# Patient Record
Sex: FEMALE | Race: WHITE | NOT HISPANIC OR LATINO | Employment: UNEMPLOYED | URBAN - METROPOLITAN AREA
[De-identification: names, ages, dates, MRNs, and addresses within clinical notes are randomized per-mention and may not be internally consistent; named-entity substitution may affect disease eponyms.]

---

## 2017-03-09 ENCOUNTER — OFFICE VISIT (OUTPATIENT)
Dept: INTERNAL MEDICINE | Facility: CLINIC | Age: 57
End: 2017-03-09

## 2017-03-09 VITALS
HEIGHT: 62 IN | BODY MASS INDEX: 30.91 KG/M2 | WEIGHT: 168 LBS | DIASTOLIC BLOOD PRESSURE: 80 MMHG | SYSTOLIC BLOOD PRESSURE: 160 MMHG | HEART RATE: 78 BPM

## 2017-03-09 DIAGNOSIS — I10 BENIGN ESSENTIAL HTN: ICD-10-CM

## 2017-03-09 DIAGNOSIS — R06.09 DYSPNEA ON EXERTION: ICD-10-CM

## 2017-03-09 DIAGNOSIS — J45.20 MILD INTERMITTENT ASTHMA WITHOUT COMPLICATION: Primary | ICD-10-CM

## 2017-03-09 PROCEDURE — 99213 OFFICE O/P EST LOW 20 MIN: CPT | Performed by: INTERNAL MEDICINE

## 2017-03-09 RX ORDER — HYDROCHLOROTHIAZIDE 12.5 MG/1
12.5 TABLET ORAL DAILY
Qty: 90 TABLET | Refills: 3 | Status: SHIPPED | OUTPATIENT
Start: 2017-03-09 | End: 2017-05-10

## 2017-03-09 NOTE — PROGRESS NOTES
Subjective   Nguyen Lopez is a 56 y.o. female.     History of Present Illness she is here today for follow-up of hypertension.  On losartan 50 mg daily she has had systolic pressure in the 120s to 130s and diastolic in the 60s to 70s.  She does relate problems with dyspnea on exertion when climbing one flight of stairs as well as some swelling in the ankles over the last one or 2 months.  She denied any PND, wheezing, or unusual cough.  She has not had any chest pain.  She had difficulty with swelling in the ankles previously with a negative evaluation.        Review of Systems   Constitutional: Negative for activity change, appetite change and fatigue.   Respiratory: Positive for cough and shortness of breath. Negative for chest tightness and wheezing.    Cardiovascular: Positive for leg swelling. Negative for chest pain and palpitations.   Gastrointestinal: Negative for abdominal pain, diarrhea, nausea and vomiting.   Genitourinary: Negative for dysuria, flank pain and hematuria.   Musculoskeletal: Positive for back pain and neck pain.   Neurological: Negative for dizziness and weakness.       Objective   Physical Exam   Constitutional: She is oriented to person, place, and time. She appears well-developed and well-nourished. She is active. She does not appear ill.   Eyes: Conjunctivae are normal.   Neck: Carotid bruit is not present.   Cardiovascular: Normal rate, regular rhythm, S1 normal and S2 normal.  Exam reveals no S3 and no S4.    No murmur heard.  Pulses:       Dorsalis pedis pulses are 2+ on the right side, and 2+ on the left side.   Pulmonary/Chest: No tachypnea. No respiratory distress. She has no decreased breath sounds. She has no wheezes. She has no rhonchi. She has no rales.   Abdominal: Soft. Normal appearance and bowel sounds are normal. She exhibits no abdominal bruit and no mass. There is no hepatosplenomegaly. There is no tenderness.       Vascular Status -  Her exam exhibits right foot  edema (Trace malleolar edema). Her exam exhibits left foot edema (Trace malleolar edema).  Neurological: She is alert and oriented to person, place, and time. Gait normal.   Psychiatric: She has a normal mood and affect. Her speech is normal and behavior is normal. Judgment and thought content normal. Cognition and memory are normal.       Assessment/Plan  November lab showed a normal CBC, CMP, urinalysis.  Echocardiogram done in November 2015 was normal.        Assessment #1 hypertension-good out of office readings on losartan #2 dyspnea on exertion and mild edema-question significance.  I wished to repeat 2-D echocardiogram but for financial reasons she does not wish to do so at this time.    Plan #1 add hydrochlorothiazide 12.5 mg by mouth daily to losartan 50 mg by mouth daily.  Routine follow-up with me in 2 months.

## 2017-04-28 DIAGNOSIS — F41.9 ANXIETY DISORDER, UNSPECIFIED TYPE: Primary | ICD-10-CM

## 2017-04-28 DIAGNOSIS — G43.019 COMMON MIGRAINE WITH INTRACTABLE MIGRAINE: ICD-10-CM

## 2017-05-02 RX ORDER — ALPRAZOLAM 0.5 MG/1
TABLET ORAL
Qty: 30 TABLET | Refills: 5 | Status: SHIPPED | OUTPATIENT
Start: 2017-05-02 | End: 2017-10-30 | Stop reason: SDUPTHER

## 2017-05-02 RX ORDER — BUTALBITAL, ACETAMINOPHEN AND CAFFEINE 50; 325; 40 MG/1; MG/1; MG/1
TABLET ORAL
Qty: 60 TABLET | Refills: 5 | Status: SHIPPED | OUTPATIENT
Start: 2017-05-03 | End: 2017-10-30 | Stop reason: SDUPTHER

## 2017-05-10 ENCOUNTER — OFFICE VISIT (OUTPATIENT)
Dept: INTERNAL MEDICINE | Facility: CLINIC | Age: 57
End: 2017-05-10

## 2017-05-10 VITALS
HEART RATE: 72 BPM | DIASTOLIC BLOOD PRESSURE: 80 MMHG | HEIGHT: 62 IN | SYSTOLIC BLOOD PRESSURE: 158 MMHG | WEIGHT: 167 LBS | BODY MASS INDEX: 30.73 KG/M2

## 2017-05-10 DIAGNOSIS — I87.2 CHRONIC VENOUS INSUFFICIENCY: ICD-10-CM

## 2017-05-10 DIAGNOSIS — I10 BENIGN ESSENTIAL HTN: ICD-10-CM

## 2017-05-10 DIAGNOSIS — M47.812 OSTEOARTHRITIS OF CERVICAL SPINE, UNSPECIFIED SPINAL OSTEOARTHRITIS COMPLICATION STATUS: Primary | ICD-10-CM

## 2017-05-10 PROBLEM — G43.009 MIGRAINE WITHOUT AURA, NOT INTRACTABLE: Status: ACTIVE | Noted: 2017-05-10

## 2017-05-10 PROBLEM — R06.09 DYSPNEA ON EXERTION: Status: RESOLVED | Noted: 2017-03-09 | Resolved: 2017-05-10

## 2017-05-10 PROCEDURE — 99213 OFFICE O/P EST LOW 20 MIN: CPT | Performed by: INTERNAL MEDICINE

## 2017-06-16 ENCOUNTER — TELEPHONE (OUTPATIENT)
Dept: INTERNAL MEDICINE | Facility: CLINIC | Age: 57
End: 2017-06-16

## 2017-06-16 RX ORDER — OXYCODONE HYDROCHLORIDE AND ACETAMINOPHEN 5; 325 MG/1; MG/1
1 TABLET ORAL 2 TIMES DAILY PRN
Qty: 40 TABLET | Refills: 0 | Status: SHIPPED | OUTPATIENT
Start: 2017-06-16 | End: 2017-08-02 | Stop reason: SDUPTHER

## 2017-06-16 NOTE — TELEPHONE ENCOUNTER
Ms Lopez would like to go back on diet pill, Phentermine. she was on this in the past.    Please advise

## 2017-06-16 NOTE — TELEPHONE ENCOUNTER
----- Message from Florencia Yan sent at 6/15/2017 11:11 AM EDT -----  Contact: Patient  Patient called requesting refill on     oxyCODONE-acetaminophen (PERCOCET) 5-325 MG per tablet    And also would like to go back on diet pill, Phentermine.  States she was on this in the past.    Would like to  both at the same time    Patient:  [phone]    Pharmacy:  [facility] [phone] [place] [phone]

## 2017-06-23 ENCOUNTER — TELEPHONE (OUTPATIENT)
Dept: INTERNAL MEDICINE | Facility: CLINIC | Age: 57
End: 2017-06-23

## 2017-06-23 NOTE — TELEPHONE ENCOUNTER
----- Message from Florencia Yan sent at 6/23/2017  9:15 AM EDT -----  Contact: Patient  Patient called.  She had picked up her Rx for Percocet this week.  When she took it to the pharmacy she was told our office had to call and give PA for this, and that this was a new law for narcotics with quantity more than 14.  States we need to tell pharmacy she has a chronic condition with diseased spine.  Please advise.     Patient:  [phone]     Pharmacy:  [facility] [address] [place] [address] AT [address] - [phone]  - [phone] FX

## 2017-06-23 NOTE — TELEPHONE ENCOUNTER
Entered PA into [employer].  Was told it would take 24 to 72 hours to get a response, from today 06/23/2017.

## 2017-06-26 ENCOUNTER — TELEPHONE (OUTPATIENT)
Dept: INTERNAL MEDICINE | Facility: CLINIC | Age: 57
End: 2017-06-26

## 2017-08-02 RX ORDER — OXYCODONE HYDROCHLORIDE AND ACETAMINOPHEN 5; 325 MG/1; MG/1
1 TABLET ORAL 2 TIMES DAILY PRN
Qty: 30 TABLET | Refills: 0 | Status: SHIPPED | OUTPATIENT
Start: 2017-08-02 | End: 2017-10-19 | Stop reason: SDUPTHER

## 2017-08-02 NOTE — TELEPHONE ENCOUNTER
----- Message from Gordon Gibson sent at 8/1/2017 10:34 AM EDT -----  Contact: pt  Pt calling would like refill on rx. Please call when ready for pickup.    oxyCODONE-acetaminophen (PERCOCET) 5-325 MG per tablet    Pt#[phone]    Pt says that she is coming in from [facility] tomorrow and would like to know if she can get that tomorrow .

## 2017-09-13 ENCOUNTER — OFFICE VISIT (OUTPATIENT)
Dept: INTERNAL MEDICINE | Facility: CLINIC | Age: 57
End: 2017-09-13

## 2017-09-13 VITALS
SYSTOLIC BLOOD PRESSURE: 132 MMHG | WEIGHT: 167 LBS | HEIGHT: 62 IN | BODY MASS INDEX: 30.73 KG/M2 | HEART RATE: 66 BPM | DIASTOLIC BLOOD PRESSURE: 80 MMHG

## 2017-09-13 DIAGNOSIS — M72.2 PLANTAR FASCIITIS: ICD-10-CM

## 2017-09-13 DIAGNOSIS — G43.009 MIGRAINE WITHOUT AURA AND WITHOUT STATUS MIGRAINOSUS, NOT INTRACTABLE: ICD-10-CM

## 2017-09-13 DIAGNOSIS — I10 BENIGN ESSENTIAL HTN: ICD-10-CM

## 2017-09-13 DIAGNOSIS — M47.812 OSTEOARTHRITIS OF CERVICAL SPINE, UNSPECIFIED SPINAL OSTEOARTHRITIS COMPLICATION STATUS: ICD-10-CM

## 2017-09-13 DIAGNOSIS — M51.37 DDD (DEGENERATIVE DISC DISEASE), LUMBOSACRAL: ICD-10-CM

## 2017-09-13 DIAGNOSIS — J45.20 MILD INTERMITTENT ASTHMA WITHOUT COMPLICATION: ICD-10-CM

## 2017-09-13 DIAGNOSIS — K51.30: ICD-10-CM

## 2017-09-13 DIAGNOSIS — F41.1 GENERALIZED ANXIETY DISORDER: Primary | ICD-10-CM

## 2017-09-13 PROCEDURE — 99214 OFFICE O/P EST MOD 30 MIN: CPT | Performed by: INTERNAL MEDICINE

## 2017-09-13 NOTE — PROGRESS NOTES
Subjective   Nguyen Lopez is a 56 y.o. female.     History of Present Illness she is here today for her yearly visit which includes follow-up of severe cervical spine and lumbar spine degenerative joint and degenerative disc disease with chronic pain, hypertension, chronic proctocolitis secondary to ulcerative colitis, chronic migraine, and asthma.  Her home blood pressure readings have been 130s over 80s generally.  She has frequent headaches for which uses Fiorinal in the morning.  Part of this relates to her chronic cervical spine pain as well as her chronic lumbar spine pain.  She uses 6 ibuprofen per day as well as 2 oxycodone per day for some pain relief.  She has a great deal of difficulty in walking as well as sleeping.  Over the last 10 months she has developed pain in the right foot involving the heel as well as the medial border of the foot.  She denies any dizziness, syncope, or focal neurologic symptoms.  She has not had any abdominal pain, diarrhea, rectal bleeding, or melanotic stool.  She denies any PND, dyspnea, chest pain, wheezing, or cough.        Review of Systems   Constitutional: Negative for activity change, appetite change and fatigue.   HENT: Negative for trouble swallowing.    Respiratory: Negative for cough, chest tightness, shortness of breath and wheezing.    Cardiovascular: Negative for chest pain, palpitations and leg swelling.   Gastrointestinal: Negative for abdominal pain, anal bleeding, blood in stool, constipation, diarrhea, nausea and vomiting.   Genitourinary: Negative for difficulty urinating, dysuria, flank pain, frequency and hematuria.   Musculoskeletal: Positive for back pain and neck pain. Negative for arthralgias and gait problem.   Neurological: Positive for numbness and headaches. Negative for dizziness, syncope, facial asymmetry, speech difficulty and weakness.   Psychiatric/Behavioral: Negative for confusion and dysphoric mood. The patient is not  nervous/anxious.        Objective   Physical Exam   Constitutional: She is oriented to person, place, and time. Vital signs are normal. She appears well-developed and well-nourished. She is active. She does not appear ill.   Eyes: Conjunctivae are normal.   Fundoscopic exam:       The right eye shows no AV nicking, no exudate and no hemorrhage.        The left eye shows no AV nicking, no exudate and no hemorrhage.   Neck: Carotid bruit is not present. No thyroid mass and no thyromegaly present.   Cardiovascular: Normal rate, regular rhythm, S1 normal and S2 normal.  Exam reveals no S3 and no S4.    No murmur heard.  Pulses:       Posterior tibial pulses are 2+ on the right side, and 2+ on the left side.   Pulmonary/Chest: No tachypnea. No respiratory distress. She has no decreased breath sounds. She has no wheezes. She has no rhonchi. She has no rales.   Abdominal: Soft. Normal appearance and bowel sounds are normal. She exhibits no abdominal bruit and no mass. There is no hepatosplenomegaly. There is no tenderness.       Vascular Status -  Her exam exhibits no right foot edema. Her exam exhibits no left foot edema.  Neurological: She is alert and oriented to person, place, and time. She has normal strength. Gait normal.   Reflex Scores:       Bicep reflexes are 2+ on the right side.  Psychiatric: She has a normal mood and affect. Her speech is normal and behavior is normal. Judgment and thought content normal. Cognition and memory are normal.       Assessment/Plan assessment #1 hypertension-good control without medication #2 chronic ulcerative proctitis-continued remission on medication #3 chronic migraine-unfortunately frequent but stable #4 right plantar fasciitis #5 cervical spine and lumbar spine degenerative joint and degenerative disc disease-chronic and problematic #6 reactive airway disease-quiescent    Plan #1 increase oxycodone to 5 mg twice a day when necessary #2 orthopedic consult.  Routine follow-up  with me in 6 months.

## 2017-10-19 RX ORDER — OXYCODONE HYDROCHLORIDE AND ACETAMINOPHEN 5; 325 MG/1; MG/1
1 TABLET ORAL 2 TIMES DAILY PRN
Qty: 20 TABLET | Refills: 0 | Status: SHIPPED | OUTPATIENT
Start: 2017-10-19 | End: 2017-11-20 | Stop reason: SDUPTHER

## 2017-10-19 NOTE — TELEPHONE ENCOUNTER
----- Message from Florencia Yan sent at 10/18/2017  3:23 PM EDT -----  Contact: Patient   Patient called requesting refill of     oxyCODONE-acetaminophen (PERCOCET) 5-325 MG per tablet    Please call when ready to be picked up.      Patient:  [phone]

## 2017-10-19 NOTE — TELEPHONE ENCOUNTER
Please review refill request:    Last Refill: 8/2/17    Last OV: 9/13/17    DAVIDSON: Ready    Thank you,    Von

## 2017-10-30 DIAGNOSIS — G43.019 COMMON MIGRAINE WITH INTRACTABLE MIGRAINE: ICD-10-CM

## 2017-10-30 DIAGNOSIS — F41.9 ANXIETY DISORDER, UNSPECIFIED TYPE: ICD-10-CM

## 2017-10-31 RX ORDER — ALPRAZOLAM 0.5 MG/1
TABLET ORAL
Qty: 30 TABLET | Refills: 5 | Status: SHIPPED | OUTPATIENT
Start: 2017-10-31 | End: 2018-04-23 | Stop reason: SDUPTHER

## 2017-10-31 RX ORDER — BUTALBITAL, ACETAMINOPHEN AND CAFFEINE 50; 325; 40 MG/1; MG/1; MG/1
TABLET ORAL
Qty: 60 TABLET | Refills: 5 | Status: SHIPPED | OUTPATIENT
Start: 2017-10-31 | End: 2018-04-23 | Stop reason: SDUPTHER

## 2017-10-31 NOTE — TELEPHONE ENCOUNTER
Last OV: 09/13/2017    Next OV: 03/15/2018    Last Fill: 09/29/2017  For both Alprazolam and Fioricet

## 2017-11-07 RX ORDER — OXYCODONE HYDROCHLORIDE AND ACETAMINOPHEN 5; 325 MG/1; MG/1
1 TABLET ORAL 2 TIMES DAILY PRN
Qty: 20 TABLET | Refills: 0 | OUTPATIENT
Start: 2017-11-07

## 2017-11-07 NOTE — TELEPHONE ENCOUNTER
Last OV: 09/13/2017    Next OV: 03/15/2018    Last Fill: 10/23/2017  10 day supply    Ms. Lopez stated she needs the 60 Oxycodone as she is always in pain since it was cut back to 20.  Without it, she stated it is hard to get here because of her spine pain.

## 2017-11-07 NOTE — TELEPHONE ENCOUNTER
----- Message from Margaux Medina MA sent at 11/6/2017  9:26 AM EST -----  Pt requests returned call to discuss medication changes and the quantity dispensed of  Percocet.  Pt says that [place] and traveling to office for scripts with her spine pain is difficult  Pt also says her new pharmacy did not receive recent refills    Pt#[phone]    [facility]  [place] #[phone]

## 2017-11-20 RX ORDER — OXYCODONE HYDROCHLORIDE AND ACETAMINOPHEN 5; 325 MG/1; MG/1
1 TABLET ORAL 2 TIMES DAILY PRN
Qty: 20 TABLET | Refills: 0 | Status: SHIPPED | OUTPATIENT
Start: 2017-11-22 | End: 2017-12-20 | Stop reason: SDUPTHER

## 2017-11-20 NOTE — TELEPHONE ENCOUNTER
----- Message from Maribel Gomez sent at 11/20/2017  8:30 AM EST -----  Contact: pt - Dr Lucas's pt - RE: script  Pt calling and would like a refill on Rx. Pt would like to inform that pt is completely out. Please advise. Thanks      oxyCODONE-acetaminophen (PERCOCET) 5-325 MG per tablet 20 tablet 0 10/19/2017      Sig - Route: Take 1 tablet by mouth 2 (Two) Times a Day As Needed (for pain). - Oral      Pt # [phone]

## 2017-12-20 RX ORDER — OXYCODONE HYDROCHLORIDE AND ACETAMINOPHEN 5; 325 MG/1; MG/1
1 TABLET ORAL 2 TIMES DAILY PRN
Qty: 20 TABLET | Refills: 0 | Status: SHIPPED | OUTPATIENT
Start: 2017-12-22 | End: 2018-02-21 | Stop reason: SDUPTHER

## 2017-12-20 NOTE — TELEPHONE ENCOUNTER
----- Message from Maribel Gomez sent at 12/18/2017  8:58 AM EST -----  Contact: pt - Dr Lucas's pt - RE: script  Pt calling and would like a refill on Rx      oxyCODONE-acetaminophen (PERCOCET) 5-325 MG per tablet 20 tablet      Sig - Route: Take 1 tablet by mouth 2 (Two) Times a Day As Needed (for pain). - Oral      Pt # [phone]

## 2018-01-18 RX ORDER — OXYCODONE HYDROCHLORIDE AND ACETAMINOPHEN 5; 325 MG/1; MG/1
1 TABLET ORAL 2 TIMES DAILY PRN
Qty: 20 TABLET | Refills: 0 | OUTPATIENT
Start: 2018-01-18

## 2018-01-18 NOTE — TELEPHONE ENCOUNTER
----- Message from Margaux West sent at 1/17/2018 11:06 AM EST -----  Contact: Patient  Patient called in requesting refill.  Please call when ready for .    oxyCODONE-acetaminophen (PERCOCET) 5-325 MG per tablet    Pt# [phone]

## 2018-01-23 ENCOUNTER — TELEPHONE (OUTPATIENT)
Dept: INTERNAL MEDICINE | Facility: CLINIC | Age: 58
End: 2018-01-23

## 2018-01-23 ENCOUNTER — OFFICE VISIT (OUTPATIENT)
Dept: INTERNAL MEDICINE | Facility: CLINIC | Age: 58
End: 2018-01-23

## 2018-01-23 VITALS
SYSTOLIC BLOOD PRESSURE: 142 MMHG | DIASTOLIC BLOOD PRESSURE: 90 MMHG | WEIGHT: 164 LBS | HEIGHT: 62 IN | BODY MASS INDEX: 30.18 KG/M2

## 2018-01-23 DIAGNOSIS — J45.20 MILD INTERMITTENT ASTHMA WITHOUT COMPLICATION: Primary | ICD-10-CM

## 2018-01-23 DIAGNOSIS — G43.009 MIGRAINE WITHOUT AURA AND WITHOUT STATUS MIGRAINOSUS, NOT INTRACTABLE: ICD-10-CM

## 2018-01-23 DIAGNOSIS — M47.22 CERVICAL RADICULOPATHY DUE TO DEGENERATIVE JOINT DISEASE OF SPINE: ICD-10-CM

## 2018-01-23 DIAGNOSIS — I10 BENIGN ESSENTIAL HTN: ICD-10-CM

## 2018-01-23 PROBLEM — M72.2 PLANTAR FASCIITIS: Status: RESOLVED | Noted: 2017-09-13 | Resolved: 2018-01-23

## 2018-01-23 LAB
ALBUMIN SERPL-MCNC: 4.3 G/DL (ref 3.5–5.2)
ALBUMIN/GLOB SERPL: 1.5 G/DL
ALP SERPL-CCNC: 62 U/L (ref 39–117)
ALT SERPL W P-5'-P-CCNC: 16 U/L (ref 1–33)
ANION GAP SERPL CALCULATED.3IONS-SCNC: 12.4 MMOL/L
AST SERPL-CCNC: 16 U/L (ref 1–32)
BASOPHILS # BLD AUTO: 0.04 10*3/MM3 (ref 0–0.2)
BASOPHILS NFR BLD AUTO: 0.5 % (ref 0–2)
BILIRUB SERPL-MCNC: 0.3 MG/DL (ref 0.1–1.2)
BILIRUB UR QL STRIP: NEGATIVE
BUN BLD-MCNC: 14 MG/DL (ref 6–20)
BUN/CREAT SERPL: 20 (ref 7–25)
CALCIUM SPEC-SCNC: 9.1 MG/DL (ref 8.6–10.5)
CHLORIDE SERPL-SCNC: 103 MMOL/L (ref 98–107)
CLARITY UR: CLEAR
CO2 SERPL-SCNC: 26.6 MMOL/L (ref 22–29)
COLOR UR: YELLOW
CREAT BLD-MCNC: 0.7 MG/DL (ref 0.57–1)
DEPRECATED RDW RBC AUTO: 40.9 FL (ref 37–54)
EOSINOPHIL # BLD AUTO: 0.11 10*3/MM3 (ref 0–0.7)
EOSINOPHIL NFR BLD AUTO: 1.2 % (ref 0–5)
ERYTHROCYTE [DISTWIDTH] IN BLOOD BY AUTOMATED COUNT: 12.8 % (ref 11.5–15)
GFR SERPL CREATININE-BSD FRML MDRD: 87 ML/MIN/1.73
GLOBULIN UR ELPH-MCNC: 2.8 GM/DL
GLUCOSE BLD-MCNC: 103 MG/DL (ref 65–99)
GLUCOSE UR STRIP-MCNC: NEGATIVE MG/DL
HCT VFR BLD AUTO: 43.4 % (ref 34.1–44.9)
HGB BLD-MCNC: 14.2 G/DL (ref 11.2–15.7)
HGB UR QL STRIP.AUTO: NEGATIVE
KETONES UR QL STRIP: NEGATIVE
LEUKOCYTE ESTERASE UR QL STRIP.AUTO: NEGATIVE
LYMPHOCYTES # BLD AUTO: 1.97 10*3/MM3 (ref 0.8–7)
LYMPHOCYTES NFR BLD AUTO: 22.3 % (ref 10–60)
MCH RBC QN AUTO: 28.9 PG (ref 26–34)
MCHC RBC AUTO-ENTMCNC: 32.7 G/DL (ref 31–37)
MCV RBC AUTO: 88.2 FL (ref 80–100)
MONOCYTES # BLD AUTO: 0.55 10*3/MM3 (ref 0–1)
MONOCYTES NFR BLD AUTO: 6.2 % (ref 0–13)
NEUTROPHILS # BLD AUTO: 6.16 10*3/MM3 (ref 1–11)
NEUTROPHILS NFR BLD AUTO: 69.8 % (ref 30–85)
NITRITE UR QL STRIP: NEGATIVE
PH UR STRIP.AUTO: 6 [PH] (ref 5–8)
PLATELET # BLD AUTO: 213 10*3/MM3 (ref 150–450)
PMV BLD AUTO: 10.7 FL (ref 6–12)
POTASSIUM BLD-SCNC: 4.2 MMOL/L (ref 3.5–5.2)
PROT SERPL-MCNC: 7.1 G/DL (ref 6–8.5)
PROT UR QL STRIP: NEGATIVE
RBC # BLD AUTO: 4.92 10*6/MM3 (ref 3.93–5.22)
SODIUM BLD-SCNC: 142 MMOL/L (ref 136–145)
SP GR UR STRIP: 1.02 (ref 1–1.03)
UROBILINOGEN UR QL STRIP: NORMAL
WBC NRBC COR # BLD: 8.83 10*3/MM3 (ref 5–10)

## 2018-01-23 PROCEDURE — 81003 URINALYSIS AUTO W/O SCOPE: CPT | Performed by: INTERNAL MEDICINE

## 2018-01-23 PROCEDURE — 85025 COMPLETE CBC W/AUTO DIFF WBC: CPT | Performed by: INTERNAL MEDICINE

## 2018-01-23 PROCEDURE — 80053 COMPREHEN METABOLIC PANEL: CPT | Performed by: INTERNAL MEDICINE

## 2018-01-23 PROCEDURE — 36415 COLL VENOUS BLD VENIPUNCTURE: CPT | Performed by: INTERNAL MEDICINE

## 2018-01-23 PROCEDURE — 99213 OFFICE O/P EST LOW 20 MIN: CPT | Performed by: INTERNAL MEDICINE

## 2018-01-23 RX ORDER — ESTRADIOL 1 MG/G
GEL TOPICAL
COMMUNITY
Start: 2017-12-28

## 2018-01-23 NOTE — TELEPHONE ENCOUNTER
----- Message from Florencia Yan sent at 1/23/2018  2:10 PM EST -----  Contact: Patient   Patient seen by Dr. Lucas this morning and given script for     oxyCODONE-acetaminophen (PERCOCET) 5-325 MG per tablet, #30.  Date on script is wrong (01/23/2019) and pharmacy will not fill.  Patient's  will be here after work to , about 4 PM.  Patient lives  50 miles away and cannot drive back to office.  Please advise.     Patient:  [phone]

## 2018-01-23 NOTE — PROGRESS NOTES
Subjective   Nguyen Lopez is a 57 y.o. female.     History of Present Illness she is here today for follow-up of hypertension, asthma, migraine, and chronic pain primarily in the neck but also in the low back.  She has seen neurosurgery once again but she is not ready to commit to repeat surgery on the cervical spine.  She has constant pain which has become more limiting.  There is radiation from the neck down both arms especially on the left with some associated numbness.  She has stopped driving.  She is fairly limited both at night when attempting to sleep as well as during the daytime as far as physical activity is concerned.  Her present regimen includes Fioricet one in the morning and oxycodone 1 end mid day followed by ibuprofen in the evening and night.  She is using up to 1200 mg of ibuprofen per day.  She does have occasional epigastric discomfort.  She denied any nausea and vomiting.  There has not been any rectal bleeding or melanotic stool.  Home blood pressure readings are generally 140 systolic over 80s diastolic.        Review of Systems   Constitutional: Positive for activity change. Negative for appetite change and fatigue.   HENT: Negative for trouble swallowing.    Respiratory: Negative for cough, chest tightness, shortness of breath and wheezing.    Cardiovascular: Negative for chest pain, palpitations and leg swelling.   Gastrointestinal: Positive for abdominal pain and constipation. Negative for anal bleeding, blood in stool, diarrhea, nausea and vomiting.   Genitourinary: Negative for flank pain and hematuria.   Musculoskeletal: Positive for back pain and neck pain.   Neurological: Positive for numbness and headaches. Negative for dizziness, syncope, facial asymmetry, speech difficulty and weakness.       Objective   Physical Exam   Constitutional: She is oriented to person, place, and time. She appears well-developed and well-nourished. She is active. She does not appear ill.   Eyes:  Conjunctivae are normal.   Neck: Carotid bruit is not present.   Cardiovascular: Normal rate, regular rhythm, S1 normal and S2 normal.  Exam reveals no S3 and no S4.    No murmur heard.  Pulmonary/Chest: No tachypnea. No respiratory distress. She has no decreased breath sounds. She has no wheezes. She has no rhonchi. She has no rales.   Abdominal: Soft. Normal appearance and bowel sounds are normal. She exhibits no abdominal bruit and no mass. There is no hepatosplenomegaly. There is no tenderness.       Vascular Status -  Her exam exhibits no right foot edema. Her exam exhibits no left foot edema.  Neurological: She is alert and oriented to person, place, and time. She has normal strength. Gait normal.   Reflex Scores:       Bicep reflexes are 2+ on the right side and 2+ on the left side.  Psychiatric: She has a normal mood and affect. Her speech is normal and behavior is normal. Judgment and thought content normal. Cognition and memory are normal.       Assessment/Plan blood pressure by me today 128/82.    Assessment #1 cervical spine degenerative joint/degenerative disc disease-her overwhelming controlling process with chronic pain.  She does not wish to see pain management and she is not yet ready to have repeat surgery.  #2 hypertension-she prefers not to use medication at this time for better control #3 asthma-mild and generally not a problem #4 chronic migraine-part of her process is triggered by her cervical spine disease.  Presently fair control with Fiorinal.    Plan #1 no change medication #2 CBC, CMP, urinalysis today.  Routine follow-up with me in 6 months.

## 2018-02-21 DIAGNOSIS — M51.37 DDD (DEGENERATIVE DISC DISEASE), LUMBOSACRAL: Primary | ICD-10-CM

## 2018-02-21 RX ORDER — OXYCODONE HYDROCHLORIDE AND ACETAMINOPHEN 5; 325 MG/1; MG/1
1 TABLET ORAL 2 TIMES DAILY PRN
Qty: 20 TABLET | Refills: 0 | Status: SHIPPED | OUTPATIENT
Start: 2018-02-21 | End: 2018-03-23 | Stop reason: SDUPTHER

## 2018-02-21 NOTE — TELEPHONE ENCOUNTER
Please review refill request:    Last OV: 1/23/18    Last Prescribed:12/22/17    DAVIDSON: Ordered    Thank you,    Von

## 2018-02-21 NOTE — TELEPHONE ENCOUNTER
----- Message from Florencia Yan sent at 2/19/2018  3:02 PM EST -----  Contact: Patient  Patient called requesting early refill, 3 days, on her     oxyCODONE-acetaminophen (PERCOCET) 5-325 MG per tablet    Patient was seen at [facility] on 02/16/2018.  Last Monday, she slipped on ice and fell off porch injuring back and neck.  No fracture.  CT and x-rays revealed acute cervical strain and muscle or tendon tear.  Imaging reports are in Care Everywhere.  Because of this injury, patient has been taking extra pain medication.  Please advise.      Patient:  [phone]

## 2018-03-23 DIAGNOSIS — M51.37 DDD (DEGENERATIVE DISC DISEASE), LUMBOSACRAL: ICD-10-CM

## 2018-03-23 RX ORDER — OXYCODONE HYDROCHLORIDE AND ACETAMINOPHEN 5; 325 MG/1; MG/1
1 TABLET ORAL 2 TIMES DAILY PRN
Qty: 20 TABLET | Refills: 0 | Status: SHIPPED | OUTPATIENT
Start: 2018-03-24 | End: 2018-04-24 | Stop reason: SDUPTHER

## 2018-03-23 NOTE — TELEPHONE ENCOUNTER
----- Message from Maribel Gomez sent at 3/22/2018  1:31 PM EDT -----  Contact: pt - Dr Lucas's pt - RE: script  Pt calling and would like a refill on Rx      oxyCODONE-acetaminophen (PERCOCET) 5-325 MG per tablet 20 tablet    Sig - Route: Take 1 tablet by mouth 2 (Two) Times a Day As Needed (for pain). - Oral         Pt # [phone]

## 2018-04-23 DIAGNOSIS — G43.019 COMMON MIGRAINE WITH INTRACTABLE MIGRAINE: ICD-10-CM

## 2018-04-23 DIAGNOSIS — F41.9 ANXIETY DISORDER, UNSPECIFIED TYPE: ICD-10-CM

## 2018-04-24 DIAGNOSIS — M51.37 DDD (DEGENERATIVE DISC DISEASE), LUMBOSACRAL: ICD-10-CM

## 2018-04-24 RX ORDER — OXYCODONE HYDROCHLORIDE AND ACETAMINOPHEN 5; 325 MG/1; MG/1
1 TABLET ORAL 2 TIMES DAILY PRN
Qty: 20 TABLET | Refills: 0 | Status: SHIPPED | OUTPATIENT
Start: 2018-04-26 | End: 2018-05-24 | Stop reason: SDUPTHER

## 2018-04-24 NOTE — TELEPHONE ENCOUNTER
----- Message from Nadine Santamaria sent at 4/24/2018  9:29 AM EDT -----  Contact: Pt  Pt calling would like refill on     RX: oxyCODONE-acetaminophen (PERCOCET) 5-325 MG per tablet    Please call when ready for pickup      Pt#[phone]    Advised 2-3 days before refill is ready and WE WILL CALL--- called early

## 2018-04-26 ENCOUNTER — TELEPHONE (OUTPATIENT)
Dept: INTERNAL MEDICINE | Facility: CLINIC | Age: 58
End: 2018-04-26

## 2018-04-26 RX ORDER — BUTALBITAL, ACETAMINOPHEN AND CAFFEINE 50; 325; 40 MG/1; MG/1; MG/1
TABLET ORAL
Qty: 30 TABLET | Refills: 5 | OUTPATIENT
Start: 2018-04-26

## 2018-04-26 RX ORDER — ALPRAZOLAM 0.5 MG/1
TABLET ORAL
Qty: 30 TABLET | Refills: 2 | OUTPATIENT
Start: 2018-04-26 | End: 2018-08-20 | Stop reason: SDUPTHER

## 2018-04-26 NOTE — TELEPHONE ENCOUNTER
----- Message from Jenny Duke sent at 4/26/2018  9:56 AM EDT -----  Contact: Pharmacy  [facility] calling to verify quantity on butalbital-acetaminophen-caffeine (FIORICET, ESGIC) -40 MG per tablet. Please advise    Scripts:[phone]

## 2018-05-24 DIAGNOSIS — M51.37 DDD (DEGENERATIVE DISC DISEASE), LUMBOSACRAL: ICD-10-CM

## 2018-05-24 RX ORDER — OXYCODONE HYDROCHLORIDE AND ACETAMINOPHEN 5; 325 MG/1; MG/1
1 TABLET ORAL 2 TIMES DAILY PRN
Qty: 20 TABLET | Refills: 0 | Status: SHIPPED | OUTPATIENT
Start: 2018-05-27 | End: 2018-06-27 | Stop reason: SDUPTHER

## 2018-05-24 NOTE — TELEPHONE ENCOUNTER
----- Message from Nadine Santamaria sent at 5/24/2018  8:23 AM EDT -----  Contact: Pt  Pt calling would like refill on     RX: oxyCODONE-acetaminophen (PERCOCET) 5-325 MG per tablet    Please call when ready for pickup      Pt#[phone]    Advised 2-3 days before refill is ready and WE WILL CALL

## 2018-06-27 DIAGNOSIS — M51.37 DDD (DEGENERATIVE DISC DISEASE), LUMBOSACRAL: ICD-10-CM

## 2018-06-27 RX ORDER — OXYCODONE HYDROCHLORIDE AND ACETAMINOPHEN 5; 325 MG/1; MG/1
1 TABLET ORAL 2 TIMES DAILY PRN
Qty: 20 TABLET | Refills: 0 | Status: SHIPPED | OUTPATIENT
Start: 2018-06-28 | End: 2018-08-22 | Stop reason: SDUPTHER

## 2018-06-27 NOTE — TELEPHONE ENCOUNTER
----- Message from Nadine Santamaria sent at 6/26/2018 10:40 AM EDT -----  Contact: pt  Pt calling would like refill on     RX: oxyCODONE-acetaminophen (PERCOCET) 5-325 MG per tablet    Please call when ready for pickup      Pt#[phone]    Advised 2-3 days before refill is ready and WE WILL CALL

## 2018-07-24 ENCOUNTER — OFFICE VISIT (OUTPATIENT)
Dept: INTERNAL MEDICINE | Facility: CLINIC | Age: 58
End: 2018-07-24

## 2018-07-24 VITALS
HEIGHT: 62 IN | BODY MASS INDEX: 30 KG/M2 | WEIGHT: 163 LBS | SYSTOLIC BLOOD PRESSURE: 120 MMHG | DIASTOLIC BLOOD PRESSURE: 78 MMHG

## 2018-07-24 DIAGNOSIS — I10 BENIGN ESSENTIAL HTN: Primary | ICD-10-CM

## 2018-07-24 DIAGNOSIS — M51.37 DDD (DEGENERATIVE DISC DISEASE), LUMBOSACRAL: ICD-10-CM

## 2018-07-24 DIAGNOSIS — M47.22 CERVICAL RADICULOPATHY DUE TO DEGENERATIVE JOINT DISEASE OF SPINE: ICD-10-CM

## 2018-07-24 DIAGNOSIS — G43.009 MIGRAINE WITHOUT AURA AND WITHOUT STATUS MIGRAINOSUS, NOT INTRACTABLE: ICD-10-CM

## 2018-07-24 PROCEDURE — 99213 OFFICE O/P EST LOW 20 MIN: CPT | Performed by: INTERNAL MEDICINE

## 2018-07-24 RX ORDER — SPIRONOLACTONE 50 MG/1
50 TABLET, FILM COATED ORAL DAILY
COMMUNITY
Start: 2018-07-23

## 2018-07-24 RX ORDER — IBUPROFEN 600 MG/1
600 TABLET ORAL
COMMUNITY
Start: 2018-02-16 | End: 2020-12-16

## 2018-07-24 NOTE — PROGRESS NOTES
Subjective   Nguyen Lopez is a 57 y.o. female.     History of Present Illness she is here today for follow-up of hypertension, migraine, and chronic cervical spine and lumbar spine pain secondary to degenerative joint and degenerative disc disease.  She has had surgery in the cervical spine which did not improve her pain situation.  Unfortunately she favors her lumbar spine by certain position or attempting to lie in a certain position at night it makes her cervical spine pain worse and vice versa.  Presently she is using oxycodone one per day and Fioricet 2 per day for his chronic pains as well as her chronic headache problems.  She also has been having episodic epigastric pain with nausea and vomiting lasting a day at a time.  This has been occurring over the last 6 months.  She scheduled to see her gastroenterologist and have a colonoscopy next week.  She denies any diarrhea or rectal bleeding.  At other times her bowel habit is normal and she actually has been gaining weight.        Review of Systems   Constitutional: Negative for activity change, appetite change and fatigue.   HENT: Negative for trouble swallowing.    Respiratory: Negative for cough, chest tightness, shortness of breath and wheezing.    Cardiovascular: Negative for chest pain, palpitations and leg swelling.   Gastrointestinal: Positive for abdominal pain. Negative for anal bleeding, blood in stool, constipation, diarrhea, nausea and vomiting.   Genitourinary: Negative for flank pain and hematuria.   Musculoskeletal: Positive for back pain and neck pain. Negative for gait problem.   Neurological: Positive for headache. Negative for dizziness, syncope, facial asymmetry, speech difficulty, weakness and numbness.       Objective   Physical Exam   Constitutional: She is oriented to person, place, and time. Vital signs are normal. She appears well-developed and well-nourished. She is active. She does not appear ill. No distress.   Eyes:  Conjunctivae are normal.   Cardiovascular: Normal rate, regular rhythm, S1 normal and S2 normal.  Exam reveals no S3 and no S4.    No murmur heard.  Pulmonary/Chest: No tachypnea. No respiratory distress. She has no decreased breath sounds. She has no wheezes. She has no rhonchi. She has no rales.   Abdominal: Soft. Normal appearance and bowel sounds are normal. She exhibits no abdominal bruit and no mass. There is no hepatosplenomegaly. There is no tenderness.     Vascular Status -  Her right foot exhibits no edema. Her left foot exhibits no edema.  Neurological: She is alert and oriented to person, place, and time. Gait normal.   Psychiatric: She has a normal mood and affect. Her speech is normal and behavior is normal. Judgment and thought content normal. Cognition and memory are normal.         Assessment/Plan assessment #1 hypertension-controlled without medication at present #2 migraine-chronic daily headache #3 cervical spine and lumbar spine degenerative joint/degenerative disc disease-chronic and unremitting unfortunately.  She does not wish to see pain management and she does not wish to have any further surgery.    Plan #1 increase oxycodone to 5 mg 1 twice a day when necessary.  Routine follow-up in 6 months.

## 2018-07-27 ENCOUNTER — TELEPHONE (OUTPATIENT)
Dept: INTERNAL MEDICINE | Facility: CLINIC | Age: 58
End: 2018-07-27

## 2018-07-27 NOTE — TELEPHONE ENCOUNTER
----- Message from Jenny Duke sent at 7/27/2018  8:07 AM EDT -----  Contact: Patient  Patient calling states she saw Dr. Lucas Monday. She would like to know if she can get a steroid dose pack to get inflammation down in her neck. She has tried OTC medication and ice, nothing is helping. Please advise    Patient:[phone]    Pharmacy:[facility] - [place] in [place],, [place] [facility] [address] [facility] - [phone] [facility] [phone] FX

## 2018-08-20 DIAGNOSIS — F41.9 ANXIETY DISORDER, UNSPECIFIED TYPE: ICD-10-CM

## 2018-08-21 RX ORDER — ALPRAZOLAM 0.5 MG/1
TABLET ORAL
Qty: 30 TABLET | Refills: 5 | Status: SHIPPED | OUTPATIENT
Start: 2018-08-22

## 2018-08-21 NOTE — TELEPHONE ENCOUNTER
Please advise RX refill  Last ov 07/24/2018  Next ov not scheduled  Last filled 07/23/2018  Qty 30

## 2018-08-22 DIAGNOSIS — M51.37 DDD (DEGENERATIVE DISC DISEASE), LUMBOSACRAL: ICD-10-CM

## 2018-08-22 RX ORDER — OXYCODONE HYDROCHLORIDE AND ACETAMINOPHEN 5; 325 MG/1; MG/1
1 TABLET ORAL 2 TIMES DAILY PRN
Qty: 20 TABLET | Refills: 0 | Status: SHIPPED | OUTPATIENT
Start: 2018-08-23 | End: 2020-12-16

## 2018-08-22 NOTE — TELEPHONE ENCOUNTER
----- Message from Maribel Gomez sent at 8/22/2018  9:38 AM EDT -----  Contact: pt - Dr Lucas's pt - RE: script  Pt calling and would like a refill on Rx      oxyCODONE-acetaminophen (PERCOCET) 5-325 MG per tablet 20 tablet   Sig - Route: Take 1 tablet by mouth 2 (Two) Times a Day As Needed (for pain). - Oral     Pt will  tomorrow while in town, as pt does not live in [place]. Pt would like it to be posted dated if need be. Pt states may be off by 1 day. Please advise.    Pt # [phone]

## 2018-08-22 NOTE — TELEPHONE ENCOUNTER
Please advise RX refill  Last ov 07/24/2018  Next ov not scheduled  Last filled 07/24/2018  Qty 60

## 2020-12-16 ENCOUNTER — APPOINTMENT (OUTPATIENT)
Dept: PREADMISSION TESTING | Facility: HOSPITAL | Age: 60
End: 2020-12-16

## 2020-12-16 VITALS
BODY MASS INDEX: 31.65 KG/M2 | HEIGHT: 62 IN | OXYGEN SATURATION: 100 % | TEMPERATURE: 98 F | DIASTOLIC BLOOD PRESSURE: 84 MMHG | WEIGHT: 172 LBS | RESPIRATION RATE: 20 BRPM | SYSTOLIC BLOOD PRESSURE: 137 MMHG | HEART RATE: 68 BPM

## 2020-12-16 LAB
ANION GAP SERPL CALCULATED.3IONS-SCNC: 8.7 MMOL/L (ref 5–15)
BUN SERPL-MCNC: 14 MG/DL (ref 6–20)
BUN/CREAT SERPL: 23 (ref 7–25)
CALCIUM SPEC-SCNC: 9.4 MG/DL (ref 8.6–10.5)
CHLORIDE SERPL-SCNC: 103 MMOL/L (ref 98–107)
CO2 SERPL-SCNC: 23.3 MMOL/L (ref 22–29)
CREAT SERPL-MCNC: 0.61 MG/DL (ref 0.57–1)
DEPRECATED RDW RBC AUTO: 34.6 FL (ref 37–54)
ERYTHROCYTE [DISTWIDTH] IN BLOOD BY AUTOMATED COUNT: 11.5 % (ref 12.3–15.4)
GFR SERPL CREATININE-BSD FRML MDRD: 101 ML/MIN/1.73
GLUCOSE SERPL-MCNC: 90 MG/DL (ref 65–99)
HCT VFR BLD AUTO: 43.9 % (ref 34–46.6)
HGB BLD-MCNC: 14.7 G/DL (ref 12–15.9)
MCH RBC QN AUTO: 28.3 PG (ref 26.6–33)
MCHC RBC AUTO-ENTMCNC: 33.5 G/DL (ref 31.5–35.7)
MCV RBC AUTO: 84.4 FL (ref 79–97)
PLATELET # BLD AUTO: 263 10*3/MM3 (ref 140–450)
PMV BLD AUTO: 9.9 FL (ref 6–12)
POTASSIUM SERPL-SCNC: 4.4 MMOL/L (ref 3.5–5.2)
RBC # BLD AUTO: 5.2 10*6/MM3 (ref 3.77–5.28)
SODIUM SERPL-SCNC: 135 MMOL/L (ref 136–145)
WBC # BLD AUTO: 9.15 10*3/MM3 (ref 3.4–10.8)

## 2020-12-16 PROCEDURE — 80048 BASIC METABOLIC PNL TOTAL CA: CPT

## 2020-12-16 PROCEDURE — U0004 COV-19 TEST NON-CDC HGH THRU: HCPCS | Performed by: OPHTHALMOLOGY

## 2020-12-16 PROCEDURE — C9803 HOPD COVID-19 SPEC COLLECT: HCPCS | Performed by: OPHTHALMOLOGY

## 2020-12-16 PROCEDURE — 85027 COMPLETE CBC AUTOMATED: CPT

## 2020-12-16 PROCEDURE — 36415 COLL VENOUS BLD VENIPUNCTURE: CPT

## 2020-12-16 RX ORDER — GABAPENTIN 300 MG/1
300 CAPSULE ORAL 3 TIMES DAILY
COMMUNITY

## 2020-12-16 RX ORDER — ACETAMINOPHEN 500 MG
1000 TABLET ORAL EVERY 6 HOURS PRN
COMMUNITY

## 2020-12-16 RX ORDER — ALBUTEROL SULFATE 90 UG/1
2 AEROSOL, METERED RESPIRATORY (INHALATION) EVERY 4 HOURS PRN
COMMUNITY

## 2020-12-17 ENCOUNTER — ANESTHESIA EVENT (OUTPATIENT)
Dept: PERIOP | Facility: HOSPITAL | Age: 60
End: 2020-12-17

## 2020-12-17 LAB — SARS-COV-2 RNA RESP QL NAA+PROBE: NOT DETECTED

## 2020-12-18 ENCOUNTER — HOSPITAL ENCOUNTER (OUTPATIENT)
Facility: HOSPITAL | Age: 60
Setting detail: HOSPITAL OUTPATIENT SURGERY
Discharge: HOME | End: 2020-12-18
Attending: OPHTHALMOLOGY | Admitting: OPHTHALMOLOGY

## 2020-12-18 ENCOUNTER — ANESTHESIA (OUTPATIENT)
Dept: PERIOP | Facility: HOSPITAL | Age: 60
End: 2020-12-18

## 2020-12-18 VITALS
HEART RATE: 74 BPM | DIASTOLIC BLOOD PRESSURE: 84 MMHG | SYSTOLIC BLOOD PRESSURE: 162 MMHG | OXYGEN SATURATION: 100 % | RESPIRATION RATE: 16 BRPM | TEMPERATURE: 97.5 F

## 2020-12-18 DIAGNOSIS — H02.9 EYELID LESION: Primary | ICD-10-CM

## 2020-12-18 PROCEDURE — 25010000002 PHENYLEPHRINE PER 1 ML: Performed by: NURSE ANESTHETIST, CERTIFIED REGISTERED

## 2020-12-18 PROCEDURE — 25010000002 MIDAZOLAM PER 1 MG: Performed by: STUDENT IN AN ORGANIZED HEALTH CARE EDUCATION/TRAINING PROGRAM

## 2020-12-18 PROCEDURE — 88305 TISSUE EXAM BY PATHOLOGIST: CPT | Performed by: OPHTHALMOLOGY

## 2020-12-18 PROCEDURE — 25010000002 PROPOFOL 10 MG/ML EMULSION: Performed by: NURSE ANESTHETIST, CERTIFIED REGISTERED

## 2020-12-18 RX ORDER — HYDROMORPHONE HYDROCHLORIDE 1 MG/ML
0.5 INJECTION, SOLUTION INTRAMUSCULAR; INTRAVENOUS; SUBCUTANEOUS
Status: DISCONTINUED | OUTPATIENT
Start: 2020-12-18 | End: 2020-12-21 | Stop reason: HOSPADM

## 2020-12-18 RX ORDER — ERYTHROMYCIN 5 MG/G
OINTMENT OPHTHALMIC 2 TIMES DAILY
Qty: 3.5 G | Refills: 1 | Status: SHIPPED | OUTPATIENT
Start: 2020-12-18

## 2020-12-18 RX ORDER — PROPOFOL 10 MG/ML
VIAL (ML) INTRAVENOUS CONTINUOUS PRN
Status: DISCONTINUED | OUTPATIENT
Start: 2020-12-18 | End: 2020-12-18 | Stop reason: SURG

## 2020-12-18 RX ORDER — SODIUM CHLORIDE 0.9 % (FLUSH) 0.9 %
3-10 SYRINGE (ML) INJECTION AS NEEDED
Status: DISCONTINUED | OUTPATIENT
Start: 2020-12-18 | End: 2020-12-21 | Stop reason: HOSPADM

## 2020-12-18 RX ORDER — LIDOCAINE HYDROCHLORIDE 10 MG/ML
0.5 INJECTION, SOLUTION EPIDURAL; INFILTRATION; INTRACAUDAL; PERINEURAL ONCE AS NEEDED
Status: DISCONTINUED | OUTPATIENT
Start: 2020-12-18 | End: 2020-12-21 | Stop reason: HOSPADM

## 2020-12-18 RX ORDER — OXYCODONE HYDROCHLORIDE AND ACETAMINOPHEN 5; 325 MG/1; MG/1
1 TABLET ORAL EVERY 6 HOURS PRN
Qty: 15 TABLET | Refills: 0 | Status: SHIPPED | OUTPATIENT
Start: 2020-12-18

## 2020-12-18 RX ORDER — ONDANSETRON 2 MG/ML
4 INJECTION INTRAMUSCULAR; INTRAVENOUS ONCE AS NEEDED
Status: DISCONTINUED | OUTPATIENT
Start: 2020-12-18 | End: 2020-12-21 | Stop reason: HOSPADM

## 2020-12-18 RX ORDER — ERYTHROMYCIN 5 MG/G
OINTMENT OPHTHALMIC AS NEEDED
Status: DISCONTINUED | OUTPATIENT
Start: 2020-12-18 | End: 2020-12-18 | Stop reason: HOSPADM

## 2020-12-18 RX ORDER — PROPARACAINE HYDROCHLORIDE 5 MG/ML
SOLUTION/ DROPS OPHTHALMIC AS NEEDED
Status: DISCONTINUED | OUTPATIENT
Start: 2020-12-18 | End: 2020-12-18 | Stop reason: HOSPADM

## 2020-12-18 RX ORDER — PROMETHAZINE HYDROCHLORIDE 25 MG/1
25 TABLET ORAL ONCE AS NEEDED
Status: DISCONTINUED | OUTPATIENT
Start: 2020-12-18 | End: 2020-12-21 | Stop reason: HOSPADM

## 2020-12-18 RX ORDER — FLUMAZENIL 0.1 MG/ML
0.2 INJECTION INTRAVENOUS AS NEEDED
Status: DISCONTINUED | OUTPATIENT
Start: 2020-12-18 | End: 2020-12-21 | Stop reason: HOSPADM

## 2020-12-18 RX ORDER — DIPHENHYDRAMINE HCL 25 MG
25 CAPSULE ORAL
Status: DISCONTINUED | OUTPATIENT
Start: 2020-12-18 | End: 2020-12-21 | Stop reason: HOSPADM

## 2020-12-18 RX ORDER — FENTANYL CITRATE 50 UG/ML
50 INJECTION, SOLUTION INTRAMUSCULAR; INTRAVENOUS
Status: DISCONTINUED | OUTPATIENT
Start: 2020-12-18 | End: 2020-12-21 | Stop reason: HOSPADM

## 2020-12-18 RX ORDER — DIPHENHYDRAMINE HYDROCHLORIDE 50 MG/ML
12.5 INJECTION INTRAMUSCULAR; INTRAVENOUS
Status: DISCONTINUED | OUTPATIENT
Start: 2020-12-18 | End: 2020-12-21 | Stop reason: HOSPADM

## 2020-12-18 RX ORDER — MAGNESIUM HYDROXIDE 1200 MG/15ML
LIQUID ORAL AS NEEDED
Status: DISCONTINUED | OUTPATIENT
Start: 2020-12-18 | End: 2020-12-18 | Stop reason: HOSPADM

## 2020-12-18 RX ORDER — LIDOCAINE HYDROCHLORIDE 20 MG/ML
INJECTION, SOLUTION INFILTRATION; PERINEURAL AS NEEDED
Status: DISCONTINUED | OUTPATIENT
Start: 2020-12-18 | End: 2020-12-18 | Stop reason: SURG

## 2020-12-18 RX ORDER — NALOXONE HCL 0.4 MG/ML
0.2 VIAL (ML) INJECTION AS NEEDED
Status: DISCONTINUED | OUTPATIENT
Start: 2020-12-18 | End: 2020-12-21 | Stop reason: HOSPADM

## 2020-12-18 RX ORDER — ACETAMINOPHEN 500 MG
500 TABLET ORAL ONCE
Status: COMPLETED | OUTPATIENT
Start: 2020-12-18 | End: 2020-12-18

## 2020-12-18 RX ORDER — PROMETHAZINE HYDROCHLORIDE 25 MG/1
25 SUPPOSITORY RECTAL ONCE AS NEEDED
Status: DISCONTINUED | OUTPATIENT
Start: 2020-12-18 | End: 2020-12-21 | Stop reason: HOSPADM

## 2020-12-18 RX ORDER — PROPOFOL 10 MG/ML
VIAL (ML) INTRAVENOUS AS NEEDED
Status: DISCONTINUED | OUTPATIENT
Start: 2020-12-18 | End: 2020-12-18 | Stop reason: SURG

## 2020-12-18 RX ORDER — MIDAZOLAM HYDROCHLORIDE 1 MG/ML
1 INJECTION INTRAMUSCULAR; INTRAVENOUS
Status: DISCONTINUED | OUTPATIENT
Start: 2020-12-18 | End: 2020-12-21 | Stop reason: HOSPADM

## 2020-12-18 RX ORDER — SODIUM CHLORIDE 0.9 % (FLUSH) 0.9 %
3 SYRINGE (ML) INJECTION EVERY 12 HOURS SCHEDULED
Status: DISCONTINUED | OUTPATIENT
Start: 2020-12-18 | End: 2020-12-21 | Stop reason: HOSPADM

## 2020-12-18 RX ORDER — SODIUM CHLORIDE, SODIUM LACTATE, POTASSIUM CHLORIDE, CALCIUM CHLORIDE 600; 310; 30; 20 MG/100ML; MG/100ML; MG/100ML; MG/100ML
9 INJECTION, SOLUTION INTRAVENOUS CONTINUOUS
Status: DISCONTINUED | OUTPATIENT
Start: 2020-12-18 | End: 2020-12-21 | Stop reason: HOSPADM

## 2020-12-18 RX ORDER — HYDROCODONE BITARTRATE AND ACETAMINOPHEN 5; 325 MG/1; MG/1
1 TABLET ORAL EVERY 6 HOURS PRN
Qty: 15 TABLET | Refills: 0 | Status: SHIPPED | OUTPATIENT
Start: 2020-12-18 | End: 2020-12-18

## 2020-12-18 RX ORDER — OXYCODONE AND ACETAMINOPHEN 7.5; 325 MG/1; MG/1
1 TABLET ORAL ONCE AS NEEDED
Status: COMPLETED | OUTPATIENT
Start: 2020-12-18 | End: 2020-12-18

## 2020-12-18 RX ORDER — LABETALOL HYDROCHLORIDE 5 MG/ML
5 INJECTION, SOLUTION INTRAVENOUS
Status: DISCONTINUED | OUTPATIENT
Start: 2020-12-18 | End: 2020-12-21 | Stop reason: HOSPADM

## 2020-12-18 RX ORDER — HYDROCODONE BITARTRATE AND ACETAMINOPHEN 7.5; 325 MG/1; MG/1
1 TABLET ORAL ONCE AS NEEDED
Status: DISCONTINUED | OUTPATIENT
Start: 2020-12-18 | End: 2020-12-21 | Stop reason: HOSPADM

## 2020-12-18 RX ADMIN — SODIUM CHLORIDE, POTASSIUM CHLORIDE, SODIUM LACTATE AND CALCIUM CHLORIDE 9 ML/HR: 600; 310; 30; 20 INJECTION, SOLUTION INTRAVENOUS at 07:44

## 2020-12-18 RX ADMIN — ACETAMINOPHEN 500 MG: 500 TABLET ORAL at 07:48

## 2020-12-18 RX ADMIN — PROPOFOL 180 MCG/KG/MIN: 10 INJECTION, EMULSION INTRAVENOUS at 09:43

## 2020-12-18 RX ADMIN — PROPOFOL 50 MG: 10 INJECTION, EMULSION INTRAVENOUS at 09:43

## 2020-12-18 RX ADMIN — MIDAZOLAM 1 MG: 1 INJECTION INTRAMUSCULAR; INTRAVENOUS at 09:20

## 2020-12-18 RX ADMIN — PHENYLEPHRINE HYDROCHLORIDE 100 MCG: 10 INJECTION INTRAVENOUS at 10:07

## 2020-12-18 RX ADMIN — LIDOCAINE HYDROCHLORIDE 50 MG: 20 INJECTION, SOLUTION INFILTRATION; PERINEURAL at 09:43

## 2020-12-18 RX ADMIN — OXYCODONE HYDROCHLORIDE AND ACETAMINOPHEN 1 TABLET: 7.5; 325 TABLET ORAL at 10:51

## 2020-12-18 NOTE — ANESTHESIA PREPROCEDURE EVALUATION
Anesthesia Evaluation     history of anesthetic complications: PONV               Airway   Mallampati: II  TM distance: >3 FB  Neck ROM: full  No difficulty expected  Dental - normal exam     Pulmonary     breath sounds clear to auscultation  (+) asthma,  Cardiovascular     Rhythm: regular  Rate: normal    (+) hypertension,       Neuro/Psych  GI/Hepatic/Renal/Endo    (+) obesity,       Musculoskeletal     (+) neck pain,   Abdominal    Substance History      OB/GYN          Other                      Anesthesia Plan    ASA 3     MAC     intravenous induction     Anesthetic plan, all risks, benefits, and alternatives have been provided, discussed and informed consent has been obtained with: patient.

## 2020-12-18 NOTE — ANESTHESIA POSTPROCEDURE EVALUATION
Patient: Nguyen Lopez    Procedure Summary     Date: 12/18/20 Room / Location:  [facility]  [facility]    Anesthesia Start: 0938 Anesthesia Stop: 1040    Procedures:       UPPER LID BLEPHAROPLASTY (Bilateral Eye)      LEFT UPPER LID EXCISION WITH INTERMEDIATE CLOSURE (Left Eye) Diagnosis:     Surgeon: Robel West MD Provider: Patrick Mancilla MD    Anesthesia Type: MAC ASA Status: 3          Anesthesia Type: MAC    Vitals  Vitals Value Taken Time   /85 12/18/20 1039   Temp     Pulse 75 12/18/20 1039   Resp 16 12/18/20 1039   SpO2 98 % 12/18/20 1039           Post Anesthesia Care and Evaluation    Patient location during evaluation: bedside  Patient participation: complete - patient participated  Level of consciousness: awake and alert  Pain management: adequate  Airway patency: patent  Anesthetic complications: No anesthetic complications  PONV Status: controlled  Cardiovascular status: blood pressure returned to baseline and acceptable  Respiratory status: acceptable  Hydration status: acceptable

## 2020-12-21 LAB
LAB AP CASE REPORT: NORMAL
LAB AP DIAGNOSIS COMMENT: NORMAL
PATH REPORT.FINAL DX SPEC: NORMAL
PATH REPORT.GROSS SPEC: NORMAL

## 2024-02-19 ENCOUNTER — LAB (OUTPATIENT)
Dept: LAB | Facility: HOSPITAL | Age: 64
End: 2024-02-19
Payer: COMMERCIAL

## 2024-02-19 ENCOUNTER — OFFICE VISIT (OUTPATIENT)
Dept: INTERNAL MEDICINE | Facility: CLINIC | Age: 64
End: 2024-02-19
Payer: COMMERCIAL

## 2024-02-19 VITALS
HEIGHT: 62 IN | WEIGHT: 174 LBS | SYSTOLIC BLOOD PRESSURE: 136 MMHG | TEMPERATURE: 98.7 F | HEART RATE: 68 BPM | DIASTOLIC BLOOD PRESSURE: 84 MMHG | OXYGEN SATURATION: 98 % | BODY MASS INDEX: 32.02 KG/M2

## 2024-02-19 DIAGNOSIS — R73.01 IMPAIRED FASTING GLUCOSE: ICD-10-CM

## 2024-02-19 DIAGNOSIS — K51.812 OTHER ULCERATIVE COLITIS WITH INTESTINAL OBSTRUCTION: ICD-10-CM

## 2024-02-19 DIAGNOSIS — J45.40 MODERATE PERSISTENT ASTHMA WITHOUT COMPLICATION: ICD-10-CM

## 2024-02-19 DIAGNOSIS — E78.2 MIXED HYPERLIPIDEMIA: ICD-10-CM

## 2024-02-19 DIAGNOSIS — Z00.00 WELL ADULT EXAM: ICD-10-CM

## 2024-02-19 DIAGNOSIS — Z87.2 HISTORY OF CYSTIC ACNE: ICD-10-CM

## 2024-02-19 DIAGNOSIS — G43.009 MIGRAINE WITHOUT AURA AND WITHOUT STATUS MIGRAINOSUS, NOT INTRACTABLE: ICD-10-CM

## 2024-02-19 DIAGNOSIS — Z00.00 WELL ADULT EXAM: Primary | ICD-10-CM

## 2024-02-19 LAB
25(OH)D3 SERPL-MCNC: 26 NG/ML (ref 30–100)
ALBUMIN SERPL-MCNC: 4.4 G/DL (ref 3.5–5.2)
ALBUMIN/GLOB SERPL: 1.8 G/DL
ALP SERPL-CCNC: 63 U/L (ref 39–117)
ALT SERPL W P-5'-P-CCNC: 11 U/L (ref 1–33)
ANION GAP SERPL CALCULATED.3IONS-SCNC: 7 MMOL/L (ref 5–15)
AST SERPL-CCNC: 15 U/L (ref 1–32)
BASOPHILS # BLD AUTO: 0.1 10*3/MM3 (ref 0–0.2)
BASOPHILS NFR BLD AUTO: 0.9 % (ref 0–1.5)
BILIRUB SERPL-MCNC: 0.4 MG/DL (ref 0–1.2)
BILIRUB UR QL STRIP: NEGATIVE
BUN SERPL-MCNC: 13 MG/DL (ref 8–23)
BUN/CREAT SERPL: 16.3 (ref 7–25)
CALCIUM SPEC-SCNC: 9.3 MG/DL (ref 8.6–10.5)
CHLORIDE SERPL-SCNC: 105 MMOL/L (ref 98–107)
CHOLEST SERPL-MCNC: 234 MG/DL (ref 0–200)
CLARITY UR: CLEAR
CO2 SERPL-SCNC: 26 MMOL/L (ref 22–29)
COLOR UR: YELLOW
CREAT SERPL-MCNC: 0.8 MG/DL (ref 0.57–1)
DEPRECATED RDW RBC AUTO: 36.6 FL (ref 37–54)
EGFRCR SERPLBLD CKD-EPI 2021: 83.9 ML/MIN/1.73
EOSINOPHIL # BLD AUTO: 0.36 10*3/MM3 (ref 0–0.4)
EOSINOPHIL NFR BLD AUTO: 3.4 % (ref 0.3–6.2)
ERYTHROCYTE [DISTWIDTH] IN BLOOD BY AUTOMATED COUNT: 12.1 % (ref 12.3–15.4)
FOLATE SERPL-MCNC: 2.81 NG/ML (ref 4.78–24.2)
GLOBULIN UR ELPH-MCNC: 2.4 GM/DL
GLUCOSE SERPL-MCNC: 87 MG/DL (ref 65–99)
GLUCOSE UR STRIP-MCNC: NEGATIVE MG/DL
HBA1C MFR BLD: 5.8 % (ref 4.8–5.6)
HCT VFR BLD AUTO: 43.2 % (ref 34–46.6)
HDLC SERPL-MCNC: 54 MG/DL (ref 40–60)
HGB BLD-MCNC: 14.3 G/DL (ref 12–15.9)
HGB UR QL STRIP.AUTO: ABNORMAL
HOLD SPECIMEN: NORMAL
IMM GRANULOCYTES # BLD AUTO: 0.05 10*3/MM3 (ref 0–0.05)
IMM GRANULOCYTES NFR BLD AUTO: 0.5 % (ref 0–0.5)
KETONES UR QL STRIP: NEGATIVE
LDLC SERPL CALC-MCNC: 155 MG/DL (ref 0–100)
LDLC/HDLC SERPL: 2.81 {RATIO}
LEUKOCYTE ESTERASE UR QL STRIP.AUTO: NEGATIVE
LYMPHOCYTES # BLD AUTO: 2.39 10*3/MM3 (ref 0.7–3.1)
LYMPHOCYTES NFR BLD AUTO: 22.7 % (ref 19.6–45.3)
MCH RBC QN AUTO: 27.9 PG (ref 26.6–33)
MCHC RBC AUTO-ENTMCNC: 33.1 G/DL (ref 31.5–35.7)
MCV RBC AUTO: 84.4 FL (ref 79–97)
MONOCYTES # BLD AUTO: 0.6 10*3/MM3 (ref 0.1–0.9)
MONOCYTES NFR BLD AUTO: 5.7 % (ref 5–12)
NEUTROPHILS NFR BLD AUTO: 66.8 % (ref 42.7–76)
NEUTROPHILS NFR BLD AUTO: 7.05 10*3/MM3 (ref 1.7–7)
NITRITE UR QL STRIP: NEGATIVE
NRBC BLD AUTO-RTO: 0 /100 WBC (ref 0–0.2)
PH UR STRIP.AUTO: 7.5 [PH] (ref 5–8)
PLATELET # BLD AUTO: 261 10*3/MM3 (ref 140–450)
PMV BLD AUTO: 10.6 FL (ref 6–12)
POTASSIUM SERPL-SCNC: 4 MMOL/L (ref 3.5–5.2)
PROT SERPL-MCNC: 6.8 G/DL (ref 6–8.5)
PROT UR QL STRIP: NEGATIVE
RBC # BLD AUTO: 5.12 10*6/MM3 (ref 3.77–5.28)
SODIUM SERPL-SCNC: 138 MMOL/L (ref 136–145)
SP GR UR STRIP: 1.01 (ref 1–1.03)
T4 FREE SERPL-MCNC: 1.09 NG/DL (ref 0.93–1.7)
TRIGL SERPL-MCNC: 140 MG/DL (ref 0–150)
TSH SERPL DL<=0.05 MIU/L-ACNC: 3.73 UIU/ML (ref 0.27–4.2)
UROBILINOGEN UR QL STRIP: ABNORMAL
VIT B12 BLD-MCNC: 286 PG/ML (ref 211–946)
VLDLC SERPL-MCNC: 25 MG/DL (ref 5–40)
WBC NRBC COR # BLD AUTO: 10.55 10*3/MM3 (ref 3.4–10.8)

## 2024-02-19 PROCEDURE — 36415 COLL VENOUS BLD VENIPUNCTURE: CPT

## 2024-02-19 PROCEDURE — 81001 URINALYSIS AUTO W/SCOPE: CPT

## 2024-02-19 PROCEDURE — 82306 VITAMIN D 25 HYDROXY: CPT

## 2024-02-19 PROCEDURE — 82746 ASSAY OF FOLIC ACID SERUM: CPT

## 2024-02-19 PROCEDURE — 99386 PREV VISIT NEW AGE 40-64: CPT | Performed by: INTERNAL MEDICINE

## 2024-02-19 PROCEDURE — 80061 LIPID PANEL: CPT

## 2024-02-19 PROCEDURE — 82607 VITAMIN B-12: CPT

## 2024-02-19 PROCEDURE — 83036 HEMOGLOBIN GLYCOSYLATED A1C: CPT

## 2024-02-19 PROCEDURE — 84439 ASSAY OF FREE THYROXINE: CPT

## 2024-02-19 PROCEDURE — 80050 GENERAL HEALTH PANEL: CPT

## 2024-02-19 RX ORDER — FLUTICASONE FUROATE AND VILANTEROL 100; 25 UG/1; UG/1
1 POWDER RESPIRATORY (INHALATION)
COMMUNITY

## 2024-02-19 NOTE — ASSESSMENT & PLAN NOTE
Patient previously on chronic treatment for this, has been off Lialda for several years.    Does get occasional bowel obstruction at the splenic flexure, has not required any resection.

## 2024-02-19 NOTE — PROGRESS NOTES
"Chief Complaint  Getting established (Saw Internal medicine in [place] hasn't been in a while. /She would like to talk about her migraines that she gets. She has a lot of pain with spine disease. She has had a triple fusion in her neck. )    Subjective      Nguyen Lopez presents to [facility]    History of Present Illness  Patient 63-year-old female with history of Crohn's disease, family history of colon cancer, RAD followed by pulmonology, migraine headaches, DJD of the spine with history of cervical fusion, among others, who is being seen in 2/24 as New Patient.  I reviewed the available records in [facility] as well as Care Everywhere.    Review of Systems   Constitutional:  Negative for appetite change, fatigue and fever.   HENT:  Negative for congestion and ear pain.    Eyes:  Negative for blurred vision.   Respiratory:  Positive for wheezing. Negative for cough, chest tightness and shortness of breath.    Cardiovascular:  Negative for chest pain, palpitations and leg swelling.   Gastrointestinal:  Negative for abdominal pain.   Genitourinary:  Negative for difficulty urinating, dysuria and hematuria.   Musculoskeletal:  Positive for back pain. Negative for arthralgias and gait problem.   Skin:  Negative for skin lesions.   Neurological:  Positive for headache. Negative for syncope, memory problem and confusion.   Psychiatric/Behavioral:  Negative for self-injury and depressed mood.        Objective   Vital Signs:   /84   Pulse 68   Temp 98.7 °F (37.1 °C) (Skin)   Ht 157.5 cm (62.01\")   Wt 78.9 kg (174 lb)   SpO2 98%   BMI 31.82 kg/m²       Physical Exam  Vitals and nursing note reviewed.   Constitutional:       General: She is not in acute distress.     Appearance: Normal appearance. She is not toxic-appearing.   HENT:      Head: Atraumatic.      Right Ear: External ear normal.      Left Ear: External ear normal.      Nose: Nose normal.      Mouth/Throat:    "   Mouth: Mucous membranes are moist.   Eyes:      General:         Right eye: No discharge.         Left eye: No discharge.      Extraocular Movements: Extraocular movements intact.      Pupils: Pupils are equal, round, and reactive to light.   Neck:      Comments: No carotid bruits.  Cardiovascular:      Rate and Rhythm: Normal rate and regular rhythm.      Pulses: Normal pulses.      Heart sounds: Normal heart sounds. No murmur heard.     No gallop.      Comments: Heart tones normal, no ectopy, no S3.  Pulmonary:      Effort: Pulmonary effort is normal. No respiratory distress.      Breath sounds: No wheezing, rhonchi or rales.      Comments: Lung fields clear, no labored respiration.  Abdominal:      General: There is no distension.      Palpations: Abdomen is soft. There is no mass.      Tenderness: There is no abdominal tenderness. There is no guarding.      Comments: No abdominal bruits.   Musculoskeletal:         General: No swelling or tenderness.      Cervical back: No tenderness.      Right lower leg: No edema.      Left lower leg: No edema.      Comments: No peripheral edema.   Skin:     General: Skin is warm and dry.      Findings: No rash.   Neurological:      General: No focal deficit present.      Mental Status: She is alert and oriented to person, place, and time. Mental status is at baseline.      Motor: No weakness.      Gait: Gait normal.   Psychiatric:         Mood and Affect: Mood normal.         Thought Content: Thought content normal.          Result Review   The following data was reviewed by: Hayden Tate MD on 02/19/2024:  [x] Laboratory  [] Microbiology  [x] Radiology  [] EKG/telemetry  [] Cardiology/Vascular  [] Pathology  [x] Old records             Assessment and Plan   Diagnoses and all orders for this visit:    1. Well adult exam (Primary)  Overview:  Preventive measures: were reviewed with the patient at this office visit.  They included but were not limited to discussions in  regards to vaccines outstanding, auto safety with seat belts and other assistive devices, fall prevention, and routine screening studies.    Exercise: Limited due to chronic back pain = no obvious ischemic sxs with routine daily activity.  Comprehensive labs: All needed as of 2/24 OV.    Covid vaccine: Refused 2/24.  Other vaccines: Refused 2/24.    [facility] 2016  MMG 2022---> but has appointment with new GYN next week as of her 2/24 OV.  COLON = the last one I can find documented in the records is from 2014, patient feels like she had 1 here in the past 3 years or so, asked her to try to find just the results. (+ FH colon CA)    SH: Single, no kids, homemaker, non-smoker.  FH: M passed colon cancer mets age 83, F passed 89 s/p hip fx/MI, 4S = thyroid/HTN x all and DM x 1.    Orders:  -     CBC & Differential; Future  -     Comprehensive Metabolic Panel; Future  -     TSH+Free T4; Future  -     Urinalysis With Culture If Indicated -; Future  -     Vitamin D,25-Hydroxy; Future  -     Vitamin B12 anemia; Future  -     Folate anemia; Future    2. Impaired fasting glucose  Assessment & Plan:  Patient's A1c noted to be 5.9 in 2022, will check this soon as of her 2/24 new patient appointment.    Orders:  -     Hemoglobin A1c; Future    3. Mixed hyperlipidemia  Assessment & Plan:    in 2019.    Orders:  -     Lipid Panel; Future    4. Other ulcerative colitis with intestinal obstruction  Overview:  CT abdomen 7/21:  Mild circumferential wall thickening of the descending colon with mild pericolonic stranding represents a nonspecific colitis. There is colonic diverticulosis but no specific inflamed diverticulum is demonstrated. Changes of chronic diverticulosis involves the sigmoid colon.     Assessment & Plan:  Patient previously on chronic treatment for this, has been off Lialda for several years.    Does get occasional bowel obstruction at the splenic flexure, has not required any resection.      5. History of cystic  acne  Assessment & Plan:  Patient uses PRN doxycycline for this.      6. Migraine without aura and without status migrainosus, not intractable  Overview:  Neuro note '22:    Ms. Lopez was last seen 04/05/2021.    Ms. Lopez is followed for the chronic condition(s):  Chronic migraine    My plan at that time was:  We discussed in detail that I am not willing to prescribe Fioricet to be used on a more frequent basis, specifically referencing data from the AMPP trial that when taken 5 days per month leads to further chronification of migraine      Botox was in the past used once and provided some benefit, unfortunately is cost prohibitive to continue     I think that additional preventive treatment, aimed at reducing the frequency of headache may be beneficial  To accomplish this, I would suggest starting topiramate 50mg nightly  Take 1/2 pill nightly for the first week then raise to a full pill     On an as needed basis, particularly to help with muscle spasms / tightness --- I would suggest using tizanidine 4mg as needed    Then finally, for severe headache now that the concern of ischemic colitis has been resolved, I would suggest using rizatriptan ODT 10mg     We discussed today, that I would refer her to pain management, but she declined this referral offer     Assessment & Plan:  Referral placed.        Orders:  -     Ambulatory Referral to Neurology    7. Moderate persistent asthma without complication  Assessment & Plan:  Patient is followed by pulmonology for this.  She takes the Breo Ellipta daily, and has albuterol for rescue inhaler.  Her sats are 98% on room air.  She is a former smoker, but it has been 40 years since she smoked it sounds like.  No indication for chest CTs etc.          BMI is >= 30 and <35. (Class 1 Obesity). The following options were offered after discussion;: exercise counseling/recommendations and nutrition counseling/recommendations            Follow Up   Return in about 6 weeks  (around 4/1/2024).  Patient was given instructions and counseling regarding her condition or for health maintenance advice. Please see specific information pulled into the AVS if appropriate.     Total Time Spent:   minutes     This time includes time spent by me in the following activities: preparing for the visit, reviewing extensive past medical history and tests, performing a medically appropriate examination and/or evaluation, counseling and educating the patient and/or caregivers, ordering medications, tests, or procedures, referring and/or communicating with other health care professionals and documenting information in the medical record all on this date of service.

## 2024-02-19 NOTE — ASSESSMENT & PLAN NOTE
Patient's A1c noted to be 5.9 in 2022, will check this soon as of her 2/24 new patient appointment.

## 2024-02-19 NOTE — ASSESSMENT & PLAN NOTE
Patient is followed by pulmonology for this.  She takes the Breo Ellipta daily, and has albuterol for rescue inhaler.  Her sats are 98% on room air.  She is a former smoker, but it has been 40 years since she smoked it sounds like.  No indication for chest CTs etc.

## 2024-02-20 LAB
BACTERIA UR QL AUTO: ABNORMAL /HPF
HYALINE CASTS UR QL AUTO: ABNORMAL /LPF
RBC # UR STRIP: ABNORMAL /HPF
REF LAB TEST METHOD: ABNORMAL
SQUAMOUS #/AREA URNS HPF: ABNORMAL /HPF
WBC # UR STRIP: ABNORMAL /HPF

## 2024-03-12 ENCOUNTER — OFFICE VISIT (OUTPATIENT)
Dept: OBSTETRICS AND GYNECOLOGY | Age: 64
End: 2024-03-12
Payer: COMMERCIAL

## 2024-03-12 VITALS
DIASTOLIC BLOOD PRESSURE: 84 MMHG | HEIGHT: 62 IN | SYSTOLIC BLOOD PRESSURE: 130 MMHG | BODY MASS INDEX: 32.2 KG/M2 | WEIGHT: 175 LBS

## 2024-03-12 DIAGNOSIS — Z01.419 ENCOUNTER FOR GYNECOLOGICAL EXAMINATION: Primary | ICD-10-CM

## 2024-03-12 PROCEDURE — 99386 PREV VISIT NEW AGE 40-64: CPT | Performed by: NURSE PRACTITIONER

## 2024-03-12 PROCEDURE — 99459 PELVIC EXAMINATION: CPT | Performed by: NURSE PRACTITIONER

## 2024-03-12 RX ORDER — ESTRADIOL 1 MG/G
1 GEL TOPICAL DAILY
Qty: 1 G | Refills: 11 | Status: SHIPPED | OUTPATIENT
Start: 2024-03-12

## 2024-03-12 NOTE — PROGRESS NOTES
Subjective       History of Present Illness    Chief Complaint   Patient presents with    Annual Exam     New pt annual exam, do not see a pap on file (pt hx of hysterectomy) , m/g 2022 (needs to schedule) , colonoscopy about 4 years ago.        Nguyen Lopez is a 63 y.o. female who presents for annual exam.  New patient  History of hysterectomy for fibroids  Previous OB/gyn retired  Patient has used topical estrogen gel since her hysterectomy and wishes to continue it  She did go a time period without it and had hot flashes every 5 mins that she could not tolerate  Patient reports that she is not currently experiencing any symptoms of urinary incontinence.      The following portions of the patient's history were reviewed and updated as appropriate: allergies, current medications, past family history, past medical history, past social history, past surgical history and problem list.      Current contraception: status post hysterectomy  History of abnormal Pap smear: no  Perform regular self breast exam: yes - regular  Family history of uterine or ovarian cancer: no  Family History of colon cancer: yes - mother  Family history of breast cancer: no    Mammogram: ordered.  Colonoscopy: up to date.  DEXA: not indicated.  Last Pap:unsure    Social History    Tobacco Use      Smoking status: Former        Packs/day: 0.00        Years: 0.1 packs/day for 10.0 years (1.0 ttl pk-yrs)        Types: Cigarettes        Start date:         Quit date:         Years since quittin.2        Passive exposure: Past      Smokeless tobacco: Never    Exercise: moderately active  Calcium/Vitamin D: uses supplements    The following portions of the patient's history were reviewed and updated as appropriate: allergies, current medications, past family history, past medical history, past social history, past surgical history, and problem list.    Review of Systems   Constitutional: Negative.    HENT: Negative.    "  Eyes: Negative.    Respiratory: Negative.     Cardiovascular: Negative.    Gastrointestinal: Negative.    Endocrine: Negative.    Genitourinary: Negative.    Musculoskeletal: Negative.    Skin: Negative.    Allergic/Immunologic: Negative.    Neurological: Negative.    Hematological: Negative.    Psychiatric/Behavioral: Negative.           Objective   Physical Exam  Constitutional:       Appearance: She is well-developed.   Neck:      Thyroid: No thyroid mass or thyromegaly.   Cardiovascular:      Rate and Rhythm: Normal rate and regular rhythm.      Heart sounds: Normal heart sounds.   Pulmonary:      Effort: Pulmonary effort is normal.      Breath sounds: Normal breath sounds.   Chest:   Breasts:     Right: No mass, nipple discharge, skin change or tenderness.      Left: No mass, nipple discharge, skin change or tenderness.   Abdominal:      Palpations: Abdomen is soft.   Genitourinary:     Vagina: Normal.      Adnexa:         Right: No mass.          Left: No mass.        Rectum: Normal.   Neurological:      Mental Status: She is alert and oriented to person, place, and time.   Psychiatric:         Behavior: Behavior normal.         /84   Ht 157.5 cm (62\")   Wt 79.4 kg (175 lb)   BMI 32.01 kg/m²     Assessment & Plan   Diagnoses and all orders for this visit:    1. Encounter for gynecological examination (Primary)  -     Mammo Screening Digital Tomosynthesis Bilateral With CAD  -     IGP, Apt HPV,rfx 16 / 18,45    Other orders  -     estradiol (Divigel) 1 MG/GM gel; Apply 1 Application topically to the appropriate area as directed Daily.  Dispense: 1 g; Refill: 11          Breast self exam technique reviewed and patient encouraged to perform self-exam monthly.  Discussed healthy lifestyle modifications.  Pap smear done with HPV  R/B/A reviewed of HRT, patient wishes to continue current regimen, is open to trying every other day to see if symptoms are controlled  Recommended 30 minutes of aerobic " exercise five times per week.  Discussed calcium needs to prevent osteoporosis

## 2024-03-16 LAB
CYTOLOGIST CVX/VAG CYTO: NORMAL
CYTOLOGY CVX/VAG DOC CYTO: NORMAL
CYTOLOGY CVX/VAG DOC THIN PREP: NORMAL
DX ICD CODE: NORMAL
HPV I/H RISK 4 DNA CVX QL PROBE+SIG AMP: NEGATIVE
Lab: NORMAL
OTHER STN SPEC: NORMAL
STAT OF ADQ CVX/VAG CYTO-IMP: NORMAL

## 2024-03-28 ENCOUNTER — OFFICE VISIT (OUTPATIENT)
Dept: INTERNAL MEDICINE | Age: 64
End: 2024-03-28
Payer: COMMERCIAL

## 2024-03-28 VITALS
SYSTOLIC BLOOD PRESSURE: 184 MMHG | HEIGHT: 62 IN | TEMPERATURE: 98.6 F | BODY MASS INDEX: 32.2 KG/M2 | HEART RATE: 70 BPM | DIASTOLIC BLOOD PRESSURE: 85 MMHG | WEIGHT: 175 LBS | OXYGEN SATURATION: 97 %

## 2024-03-28 DIAGNOSIS — J45.40 MODERATE PERSISTENT ASTHMA WITHOUT COMPLICATION: ICD-10-CM

## 2024-03-28 DIAGNOSIS — E78.2 MIXED HYPERLIPIDEMIA: ICD-10-CM

## 2024-03-28 DIAGNOSIS — R73.01 IMPAIRED FASTING GLUCOSE: Primary | ICD-10-CM

## 2024-03-28 DIAGNOSIS — E55.9 VITAMIN D DEFICIENCY: ICD-10-CM

## 2024-03-28 DIAGNOSIS — Z00.00 WELL ADULT EXAM: ICD-10-CM

## 2024-03-28 DIAGNOSIS — M47.22 CERVICAL RADICULOPATHY DUE TO DEGENERATIVE JOINT DISEASE OF SPINE: ICD-10-CM

## 2024-03-28 NOTE — ASSESSMENT & PLAN NOTE
A1c is 5.8 as of her 3/24 office visit.  Her fasting sugars are at only in the mid 80s.  Just recommend she watch the simple sugars, we will repeat this in about 6 months.

## 2024-03-28 NOTE — PROGRESS NOTES
"Chief Complaint  Follow-up (Pt is a 63 y.o. female, present in office today for 6 week follow up; Hyperlipidemia, Migraines)    Subjective      Nguyen Lopez presents to [facility]    History of Present Illness  Patient 63-year-old female with history of Crohn's disease, family history of colon cancer, RAD followed by pulmonology, migraine headaches, DJD of the spine with history of cervical fusion, among others, seen 2/24 as New Patient, and who is coming in now 3/24 for initial follow-up.  We will review her med list, go over recent labs together, address care gaps, and make further recommendations at that time.    Review of Systems   Constitutional:  Negative for appetite change, fatigue and fever.   HENT:  Negative for congestion and ear pain.    Eyes:  Negative for blurred vision.   Respiratory:  Positive for wheezing. Negative for cough, chest tightness and shortness of breath.    Cardiovascular:  Negative for chest pain, palpitations and leg swelling.   Gastrointestinal:  Negative for abdominal pain.   Genitourinary:  Negative for difficulty urinating, dysuria and hematuria.   Musculoskeletal:  Positive for back pain. Negative for arthralgias and gait problem.   Skin:  Negative for skin lesions.   Neurological:  Positive for headache. Negative for syncope, memory problem and confusion.   Psychiatric/Behavioral:  Negative for self-injury and depressed mood.        Objective   Vital Signs:   BP (!) 184/85 (BP Location: Left arm, Patient Position: Sitting, Cuff Size: Adult)   Pulse 70   Temp 98.6 °F (37 °C) (Temporal)   Ht 157.5 cm (62\")   Wt 79.4 kg (175 lb)   SpO2 97%   BMI 32.01 kg/m²       Physical Exam  Vitals and nursing note reviewed.   Constitutional:       General: She is not in acute distress.     Appearance: Normal appearance. She is not toxic-appearing.   HENT:      Head: Atraumatic.      Right Ear: External ear normal.      Left Ear: External ear " normal.      Nose: Nose normal.      Mouth/Throat:      Mouth: Mucous membranes are moist.   Eyes:      General:         Right eye: No discharge.         Left eye: No discharge.      Extraocular Movements: Extraocular movements intact.      Pupils: Pupils are equal, round, and reactive to light.   Neck:      Comments: No carotid bruits.  Cardiovascular:      Rate and Rhythm: Normal rate and regular rhythm.      Pulses: Normal pulses.      Heart sounds: Normal heart sounds. No murmur heard.     No gallop.      Comments: Heart tones normal, no ectopy, no S3.  Pulmonary:      Effort: Pulmonary effort is normal. No respiratory distress.      Breath sounds: No wheezing, rhonchi or rales.      Comments: Lung fields clear, no labored respiration.  Abdominal:      General: There is no distension.      Palpations: Abdomen is soft. There is no mass.      Tenderness: There is no abdominal tenderness. There is no guarding.      Comments: No abdominal bruits.   Musculoskeletal:         General: No swelling or tenderness.      Cervical back: No tenderness.      Right lower leg: No edema.      Left lower leg: No edema.      Comments: No peripheral edema.   Skin:     General: Skin is warm and dry.      Findings: No rash.   Neurological:      General: No focal deficit present.      Mental Status: She is alert and oriented to person, place, and time. Mental status is at baseline.      Motor: No weakness.      Gait: Gait normal.   Psychiatric:         Mood and Affect: Mood normal.         Thought Content: Thought content normal.          Result Review   The following data was reviewed by: Hayden Tate MD on 02/19/2024:  [x] Laboratory  [] Microbiology  [x] Radiology  [] EKG/telemetry  [] Cardiology/Vascular  [] Pathology  [x] Old records             Assessment and Plan   Diagnoses and all orders for this visit:    1. Impaired fasting glucose (Primary)  Assessment & Plan:  A1c is 5.8 as of her 3/24 office visit.  Her fasting sugars  are at only in the mid 80s.  Just recommend she watch the simple sugars, we will repeat this in about 6 months.    Orders:  -     Hemoglobin A1c; Future    2. Mixed hyperlipidemia  Assessment & Plan:  LDL is trended up from 140 to 155 as of her 3/24 office visit.  Her 10-year risk is 4.3%.  She does have mildly elevated A1c, we are following this, if she trends into the diabetic range, she will need statin therapy.    Orders:  -     Comprehensive Metabolic Panel; Future  -     Lipid Panel; Future    3. Moderate persistent asthma without complication  Assessment & Plan:  Patient fairly stable this regards as of 3/24.  She is on the Breo Ellipta daily, and is using albuterol couple of times a week at least.  It's allergy season for her.  She is followed by pulmonology and will.  Seems appropriate to continue same plan of care.      4. Vitamin D deficiency  Assessment & Plan:  Vitamin D is 26, recommend she get on 2000 units daily.      5. Well adult exam  Overview:  Preventive measures: were reviewed with the patient at this office visit.  They included but were not limited to discussions in regards to vaccines outstanding, auto safety with seat belts and other assistive devices, fall prevention, and routine screening studies.    Exercise: Limited due to chronic back pain = no obvious ischemic sxs with routine daily activity.  Comprehensive labs: All needed as of 2/24 OV.    Covid vaccine: Refused 2/24.  Other vaccines: Refused 2/24.    [facility] 2016  MMG 2022---> but has appointment with new GYN next week as of her 2/24 OV.  COLON = the last one I can find documented in the records is from 2014, patient feels like she had 1 here in the past 3 years or so, asked her to try to find just the results. (+ FH colon CA)    SH: Single, no kids, homemaker, non-smoker.  FH: M passed colon cancer mets age 83, F passed 92 s/p hip fx/MI, 4S = thyroid/HTN x all and DM x 1.    Orders:  -     TSH; Future  -     Vitamin B12 anemia;  Future  -     Folate anemia; Future    6. Cervical radiculopathy due to degenerative joint disease of spine  Overview:  Cervical fusion in 2012.    Assessment & Plan:  Patient has near daily headaches secondary to this, has one as of 3/24 OV which is driving her blood pressure up presently.            BMI is >= 30 and <35. (Class 1 Obesity). The following options were offered after discussion;: exercise counseling/recommendations and nutrition counseling/recommendations            Follow Up   Return in about 6 months (around 9/28/2024).  Patient was given instructions and counseling regarding her condition or for health maintenance advice. Please see specific information pulled into the AVS if appropriate.     Total Time Spent:   minutes     This time includes time spent by me in the following activities: preparing for the visit, reviewing extensive past medical history and tests, performing a medically appropriate examination and/or evaluation, counseling and educating the patient and/or caregivers, ordering medications, tests, or procedures, referring and/or communicating with other health care professionals and documenting information in the medical record all on this date of service.

## 2024-03-28 NOTE — ASSESSMENT & PLAN NOTE
Patient fairly stable this regards as of 3/24.  She is on the Breo Ellipta daily, and is using albuterol couple of times a week at least.  It's allergy season for her.  She is followed by pulmonology and will.  Seems appropriate to continue same plan of care.

## 2024-03-28 NOTE — ASSESSMENT & PLAN NOTE
LDL is trended up from 140 to 155 as of her 3/24 office visit.  Her 10-year risk is 4.3%.  She does have mildly elevated A1c, we are following this, if she trends into the diabetic range, she will need statin therapy.

## 2024-04-09 ENCOUNTER — HOSPITAL ENCOUNTER (OUTPATIENT)
Facility: HOSPITAL | Age: 64
Discharge: HOME | End: 2024-04-09
Admitting: NURSE PRACTITIONER
Payer: COMMERCIAL

## 2024-04-09 PROCEDURE — 77067 SCR MAMMO BI INCL CAD: CPT

## 2024-04-09 PROCEDURE — 77063 BREAST TOMOSYNTHESIS BI: CPT

## 2024-04-19 ENCOUNTER — TELEPHONE (OUTPATIENT)
Dept: OBSTETRICS AND GYNECOLOGY | Age: 64
End: 2024-04-19

## 2024-04-19 RX ORDER — ESTRADIOL 1 MG/G
1 GEL TOPICAL DAILY
Qty: 1 G | Refills: 11 | Status: CANCELLED | OUTPATIENT
Start: 2024-04-19

## 2024-04-19 NOTE — TELEPHONE ENCOUNTER
DIMITRY DORAN    [phone]    PT REQUESTING MAMMO FOR 4/14/2025 TO BE SCHEDULED AFTER AE       ALSO PT RX GOT CALLED IN TO [facility]    ALSO IT SHOULDVE BEEN A 3mo SUPPLY NOT 1mo    CAN THIS BE CORRECTED

## 2024-05-01 DIAGNOSIS — Z12.31 VISIT FOR SCREENING MAMMOGRAM: Primary | ICD-10-CM

## 2024-05-01 NOTE — TELEPHONE ENCOUNTER
PT CALLING INQUIRING ABOUT THE MAMMOGRAM; PLEASE CALL PT TO SCHEDULE FOR  THE REQUESTED DATE. - THANK YOU.

## 2024-05-16 RX ORDER — ESTRADIOL 1 MG/G
1 GEL TOPICAL DAILY
Qty: 1 G | Refills: 11 | Status: SHIPPED | OUTPATIENT
Start: 2024-05-16

## 2024-05-16 NOTE — TELEPHONE ENCOUNTER
PT said her pharmacy can't get her prescription estradiol. PT is needing to change pharmacies and have it sent in again. [facility] [id number] [address]   PT sees [place]

## 2025-04-16 ENCOUNTER — HOSPITAL ENCOUNTER (OUTPATIENT)
Facility: HOSPITAL | Age: 65
Discharge: HOME | End: 2025-04-16
Admitting: NURSE PRACTITIONER
Payer: COMMERCIAL

## 2025-04-16 ENCOUNTER — OFFICE VISIT (OUTPATIENT)
Dept: OBSTETRICS AND GYNECOLOGY | Age: 65
End: 2025-04-16
Payer: COMMERCIAL

## 2025-04-16 VITALS
WEIGHT: 174.4 LBS | HEIGHT: 62 IN | BODY MASS INDEX: 32.09 KG/M2 | DIASTOLIC BLOOD PRESSURE: 78 MMHG | SYSTOLIC BLOOD PRESSURE: 128 MMHG

## 2025-04-16 DIAGNOSIS — Z12.31 VISIT FOR SCREENING MAMMOGRAM: ICD-10-CM

## 2025-04-16 DIAGNOSIS — Z78.0 MENOPAUSE: ICD-10-CM

## 2025-04-16 DIAGNOSIS — Z78.0 POST-MENOPAUSAL: ICD-10-CM

## 2025-04-16 DIAGNOSIS — Z12.31 SCREENING MAMMOGRAM FOR BREAST CANCER: Primary | ICD-10-CM

## 2025-04-16 PROCEDURE — 77063 BREAST TOMOSYNTHESIS BI: CPT

## 2025-04-16 PROCEDURE — 77067 SCR MAMMO BI INCL CAD: CPT

## 2025-04-16 RX ORDER — BUTALBITAL, ACETAMINOPHEN AND CAFFEINE 50; 325; 40 MG/1; MG/1; MG/1
1 TABLET ORAL
COMMUNITY
Start: 2025-03-20 | End: 2025-04-19

## 2025-04-16 RX ORDER — ESTRADIOL 1 MG/G
1 GEL TOPICAL DAILY
Qty: 3 G | Refills: 4 | Status: SHIPPED | OUTPATIENT
Start: 2025-04-16

## 2025-04-16 NOTE — PROGRESS NOTES
Subjective       History of Present Illness    Chief Complaint   Patient presents with    Gynecologic Exam     Ae last pap (-) Hpv (-) 3/13/24 Mg   Scope 14 cc: no complaints today        Nguyen Lopez is a 64 y.o. female who presents for annual exam.  New to our office last year  Had two major back surgeries this year, recovery was difficult but she is finally feeling relief  No vaginal complaints  No bowel or bladder problems  Patient has had a hysterectomy          OB History    Para Term  AB Living   2    2    SAB IAB Ectopic Molar Multiple Live Births              # Outcome Date GA Lbr Mike/2nd Weight Sex Type Anes PTL Lv   2 AB            1 AB                The following portions of the patient's history were reviewed and updated as appropriate: allergies, current medications, past family history, past medical history, past social history, past surgical history and problem list.      Current contraception: status post hysterectomy  History of abnormal Pap smear: no  Perform regular self breast exam: yes - regular  Family history of uterine or ovarian cancer: no  Family History of colon cancer: yes - mother  Family history of breast cancer: no     Mammogram: ordered.  Colonoscopy: up to date.  DEXA: not indicated.    Social History    Tobacco Use      Smoking status: Former        Packs/day: 0.00        Years: 0.1 packs/day for 10.0 years (1.0 ttl pk-yrs)        Types: Cigarettes        Start date:         Quit date:         Years since quittin.3        Passive exposure: Past      Smokeless tobacco: Never        The following portions of the patient's history were reviewed and updated as appropriate: allergies, current medications, past family history, past medical history, past social history, past surgical history, and problem list.    Review of Systems      Objective   Physical Exam  Constitutional:       Appearance: She is well-developed.   Neck:      Thyroid: No  "thyroid mass or thyromegaly.   Cardiovascular:      Rate and Rhythm: Normal rate and regular rhythm.      Heart sounds: Normal heart sounds.   Pulmonary:      Effort: Pulmonary effort is normal.      Breath sounds: Normal breath sounds.   Chest:   Breasts:     Right: No mass, nipple discharge, skin change or tenderness.      Left: No mass, nipple discharge, skin change or tenderness.   Abdominal:      Palpations: Abdomen is soft.   Genitourinary:     Vagina: Normal.      Adnexa:         Right: No mass.          Left: No mass.        Rectum: Normal.   Neurological:      Mental Status: She is alert and oriented to person, place, and time.   Psychiatric:         Behavior: Behavior normal.         /78   Ht 157.5 cm (62.01\")   Wt 79.1 kg (174 lb 6.4 oz)   BMI 31.89 kg/m²     Assessment & Plan   Diagnoses and all orders for this visit:    1. Screening mammogram for breast cancer (Primary)  -     Mammo Screening Digital Tomosynthesis Bilateral With CAD    2. Menopause  -     DEXA Bone Density Axial; Future    Other orders  -     estradiol (Divigel) 1 MG/GM gel; Apply 1 Application topically to the appropriate area as directed Daily.  Dispense: 3 g; Refill: 4          Breast self exam technique reviewed and patient encouraged to perform self-exam monthly.  Discussed healthy lifestyle modifications.  Pap smear not indicated  Recommended 30 minutes of aerobic exercise five times per week.  Discussed calcium needs to prevent osteoporosis  Continue HRT, r/b/a reviewed         "

## 2025-05-12 RX ORDER — ESTRADIOL 1 MG/G
1 GEL TOPICAL DAILY
Qty: 90 G | Refills: 3 | Status: SHIPPED | OUTPATIENT
Start: 2025-05-12

## (undated) DEVICE — STERILE TOOTHPICK SET: Brand: CENTURION

## (undated) DEVICE — PENCL E/S ULTRAVAC TELESCP NOSE HOLSTR 10FT

## (undated) DEVICE — IMMOB HD UNIV CLR DISP

## (undated) DEVICE — GAUZE,SPONGE,4"X4",16PLY,STRL,LF,10/TRAY: Brand: MEDLINE

## (undated) DEVICE — CROUCH CORNEAL PROTECTOR: Brand: BAUSCH + LOMB

## (undated) DEVICE — INTENDED FOR TISSUE SEPARATION, AND OTHER PROCEDURES THAT REQUIRE A SHARP SURGICAL BLADE TO PUNCTURE OR CUT.: Brand: BARD-PARKER ® CARBON RIB-BACK BLADES

## (undated) DEVICE — GLV SURG BIOGEL SENSR LTX PF SZ7.5

## (undated) DEVICE — NDL HYPO PRECISIONGLIDE REG 25G 1 1/2

## (undated) DEVICE — TRAP FLD MINIVAC MEGADYNE 100ML

## (undated) DEVICE — SUT GUT PLN FAST ABS 5/0 PC1 18IN 1915G

## (undated) DEVICE — ELECTRD NDL EZ CLN MOD 2.75IN

## (undated) DEVICE — PK ENT 40

## (undated) DEVICE — WIPE INST MEROCEL

## (undated) DEVICE — STERILE COTTON TIP 6IN 10PK: Brand: MEDLINE